# Patient Record
Sex: MALE | Race: WHITE | NOT HISPANIC OR LATINO | Employment: FULL TIME | URBAN - METROPOLITAN AREA
[De-identification: names, ages, dates, MRNs, and addresses within clinical notes are randomized per-mention and may not be internally consistent; named-entity substitution may affect disease eponyms.]

---

## 2017-03-15 DIAGNOSIS — E11.9 TYPE 2 DIABETES MELLITUS WITHOUT COMPLICATIONS (HCC): ICD-10-CM

## 2018-01-09 ENCOUNTER — GENERIC CONVERSION - ENCOUNTER (OUTPATIENT)
Dept: OTHER | Facility: OTHER | Age: 48
End: 2018-01-09

## 2018-01-10 ENCOUNTER — GENERIC CONVERSION - ENCOUNTER (OUTPATIENT)
Dept: OTHER | Facility: OTHER | Age: 48
End: 2018-01-10

## 2018-01-10 ENCOUNTER — HOSPITAL ENCOUNTER (EMERGENCY)
Facility: HOSPITAL | Age: 48
Discharge: HOME | End: 2018-01-10
Admitting: EMERGENCY MEDICINE
Payer: COMMERCIAL

## 2018-01-10 VITALS
HEIGHT: 68 IN | HEART RATE: 76 BPM | TEMPERATURE: 99 F | SYSTOLIC BLOOD PRESSURE: 183 MMHG | OXYGEN SATURATION: 99 % | BODY MASS INDEX: 30.31 KG/M2 | WEIGHT: 200 LBS | RESPIRATION RATE: 18 BRPM | DIASTOLIC BLOOD PRESSURE: 78 MMHG

## 2018-01-10 DIAGNOSIS — H60.91 OTITIS EXTERNA OF RIGHT EAR: Primary | ICD-10-CM

## 2018-01-10 LAB
ANION GAP SERPL CALCULATED.3IONS-SCNC: 4 MMOL/L (ref 4–13)
BASOPHILS # BLD AUTO: 0.01 THOUSANDS/ΜL (ref 0–0.1)
BASOPHILS NFR BLD AUTO: 0 % (ref 0–1)
BUN SERPL-MCNC: 8 MG/DL (ref 5–25)
CALCIUM SERPL-MCNC: 9 MG/DL (ref 8.3–10.1)
CHLORIDE SERPL-SCNC: 100 MMOL/L (ref 100–108)
CO2 SERPL-SCNC: 32 MMOL/L (ref 21–32)
CREAT SERPL-MCNC: 0.81 MG/DL (ref 0.6–1.3)
EOSINOPHIL # BLD AUTO: 0.11 THOUSAND/ΜL (ref 0–0.61)
EOSINOPHIL NFR BLD AUTO: 2 % (ref 0–6)
ERYTHROCYTE [DISTWIDTH] IN BLOOD BY AUTOMATED COUNT: 13 % (ref 11.6–15.1)
GFR SERPL CREATININE-BSD FRML MDRD: 105 ML/MIN/1.73SQ M
GLUCOSE SERPL-MCNC: 142 MG/DL (ref 65–140)
HCT VFR BLD AUTO: 42.9 % (ref 36.5–49.3)
HGB BLD-MCNC: 14.5 G/DL (ref 12–17)
LYMPHOCYTES # BLD AUTO: 1.7 THOUSANDS/ΜL (ref 0.6–4.47)
LYMPHOCYTES NFR BLD AUTO: 27 % (ref 14–44)
MCH RBC QN AUTO: 30.2 PG (ref 26.8–34.3)
MCHC RBC AUTO-ENTMCNC: 33.8 G/DL (ref 31.4–37.4)
MCV RBC AUTO: 89 FL (ref 82–98)
MONOCYTES # BLD AUTO: 0.88 THOUSAND/ΜL (ref 0.17–1.22)
MONOCYTES NFR BLD AUTO: 14 % (ref 4–12)
NEUTROPHILS # BLD AUTO: 3.54 THOUSANDS/ΜL (ref 1.85–7.62)
NEUTS SEG NFR BLD AUTO: 57 % (ref 43–75)
PLATELET # BLD AUTO: 153 THOUSANDS/UL (ref 149–390)
PMV BLD AUTO: 10.3 FL (ref 8.9–12.7)
POTASSIUM SERPL-SCNC: 4.4 MMOL/L (ref 3.5–5.3)
RBC # BLD AUTO: 4.8 MILLION/UL (ref 3.88–5.62)
SODIUM SERPL-SCNC: 136 MMOL/L (ref 136–145)
WBC # BLD AUTO: 6.24 THOUSAND/UL (ref 4.31–10.16)

## 2018-01-10 PROCEDURE — 99283 EMERGENCY DEPT VISIT LOW MDM: CPT

## 2018-01-10 PROCEDURE — 36415 COLL VENOUS BLD VENIPUNCTURE: CPT | Performed by: PHYSICIAN ASSISTANT

## 2018-01-10 PROCEDURE — 85025 COMPLETE CBC W/AUTO DIFF WBC: CPT | Performed by: PHYSICIAN ASSISTANT

## 2018-01-10 PROCEDURE — 80048 BASIC METABOLIC PNL TOTAL CA: CPT | Performed by: PHYSICIAN ASSISTANT

## 2018-01-10 RX ORDER — CIPROFLOXACIN AND DEXAMETHASONE 3; 1 MG/ML; MG/ML
4 SUSPENSION/ DROPS AURICULAR (OTIC) 2 TIMES DAILY
Status: DISCONTINUED | OUTPATIENT
Start: 2018-01-10 | End: 2018-01-10 | Stop reason: HOSPADM

## 2018-01-10 RX ORDER — CIPROFLOXACIN AND DEXAMETHASONE 3; 1 MG/ML; MG/ML
4 SUSPENSION/ DROPS AURICULAR (OTIC) 2 TIMES DAILY
Qty: 7.5 ML | Refills: 0 | Status: SHIPPED | OUTPATIENT
Start: 2018-01-10 | End: 2018-05-18

## 2018-01-10 RX ORDER — IBUPROFEN 600 MG/1
600 TABLET ORAL EVERY 8 HOURS PRN
Qty: 15 TABLET | Refills: 0 | Status: SHIPPED | OUTPATIENT
Start: 2018-01-10 | End: 2018-05-18

## 2018-01-10 RX ORDER — CLINDAMYCIN HYDROCHLORIDE 300 MG/1
CAPSULE ORAL
COMMUNITY
Start: 2018-01-09 | End: 2018-01-10

## 2018-01-10 RX ORDER — CIPROFLOXACIN 500 MG/1
500 TABLET, FILM COATED ORAL EVERY 12 HOURS SCHEDULED
Qty: 14 TABLET | Refills: 0 | Status: SHIPPED | OUTPATIENT
Start: 2018-01-10 | End: 2018-01-17

## 2018-01-10 RX ADMIN — CIPROFLOXACIN AND DEXAMETHASONE 4 DROP: 3; 1 SUSPENSION/ DROPS AURICULAR (OTIC) at 14:34

## 2018-01-10 NOTE — DISCHARGE INSTRUCTIONS
Otitis Externa   WHAT YOU NEED TO KNOW:   Otitis externa, or swimmer's ear, is an infection in the outer ear canal  This canal goes from the outside of the ear to the eardrum  DISCHARGE INSTRUCTIONS:   Return to the emergency department if:   · You have severe ear pain  · You are suddenly unable to hear at all  · You have new swelling in your face, behind your ears, or in your neck  · You suddenly cannot move part of your face  · Your face suddenly feels numb  Contact your healthcare provider if:   · You have a fever  · Your signs and symptoms do not get better after 2 days of treatment  · Your signs and symptoms go away for a time, but then come back  · You have questions or concerns about your condition or care  Medicines:   · NSAIDs , such as ibuprofen, help decrease swelling, pain, and fever  This medicine is available with or without a doctor's order  NSAIDs can cause stomach bleeding or kidney problems in certain people  If you take blood thinner medicine, always ask if NSAIDs are safe for you  Always read the medicine label and follow directions  Do not give these medicines to children under 10months of age without direction from your child's healthcare provider  · Acetaminophen  decreases pain and fever  It is available without a doctor's order  Ask how much to take and how often to take it  Follow directions  Acetaminophen can cause liver damage if not taken correctly  · Ear drops  that contain an antibiotic may be given  The antibiotic helps treat a bacterial infection  You may also be given steroid medicine  The steroid helps decrease redness, swelling, and pain  · Take your medicine as directed  Contact your healthcare provider if you think your medicine is not helping or if you have side effects  Tell him or her if you are allergic to any medicine  Keep a list of the medicines, vitamins, and herbs you take  Include the amounts, and when and why you take them  Bring the list or the pill bottles to follow-up visits  Carry your medicine list with you in case of an emergency  Follow up with your healthcare provider as directed:  Write down your questions so you remember to ask them during your visits  How to use eardrops:   · Lie down on your side with your infected ear facing up  · Carefully drip the correct number of eardrops into your ear  Have another person help you if possible  · Gently move the outside part of your ear back and forth to help the medicine reach your ear canal      · Stay lying down in the same position (with your ear facing up) for 3 to 5 minutes  Prevent otitis externa:   · Do not put cotton swabs or foreign objects in your ears  · Wrap a clean moist washcloth around your finger, and use it to clean your outer ear and remove extra ear wax  · Use ear plugs when you swim  Dry your outer ears completely after you swim or bathe  © 2017 [employer]  Information is for End User's use only and may not be sold, redistributed or otherwise used for commercial purposes  All illustrations and images included in CareNotes® are the copyrighted property of [employer]  or [address]  The above information is an  only  It is not intended as medical advice for individual conditions or treatments  Talk to your doctor, nurse or pharmacist before following any medical regimen to see if it is safe and effective for you

## 2018-01-11 NOTE — RESULT NOTES
Verified Results  (1) RPR [id number] 11:26AM Farshad Bourbon Order Number: [id number]     Test Name Result Flag Reference   RPR Non-Reactive  Non-Reactive

## 2018-01-12 ENCOUNTER — GENERIC CONVERSION - ENCOUNTER (OUTPATIENT)
Dept: OTHER | Facility: OTHER | Age: 48
End: 2018-01-12

## 2018-01-12 ENCOUNTER — ALLSCRIPTS OFFICE VISIT (OUTPATIENT)
Dept: OTHER | Facility: OTHER | Age: 48
End: 2018-01-12

## 2018-01-12 NOTE — RESULT NOTES
Verified Results  (1) HEMOGLOBIN A1C [id number] 08:40AM Sonny Rahman   5 7-6 4% impaired fasting glucose  >=6 5% diagnosis of diabetes    Falsely low levels are seen in conditions linked to short RBC life span-  hemolytic anemia, and splenomegaly  Falsely elevated levels are seen in situations where there is an increased production of RBC- receipt of erythropoietin or blood transfusions  Adopted from ADA-Clinical Practice Recommendations     Test Name Result Flag Reference   HEMOGLOBIN A1C 6 2 % H 4 0-5 6   EST  AVG   GLUCOSE 131 mg/dl

## 2018-01-14 NOTE — RESULT NOTES
Verified Results  (1) COMPREHENSIVE METABOLIC PANEL [id number] 50:34BL Bjorn Baumgarten Order Number: [id number]     Test Name Result Flag Reference   GLUCOSE,RANDM 129 mg/dL     If the patient is fasting, the ADA then defines impaired fasting glucose as > 100 mg/dL and diabetes as > or equal to 123 mg/dL  SODIUM 135 mmol/L L 136-145   POTASSIUM 4 6 mmol/L  3 5-5 3   CHLORIDE 99 mmol/L L 100-108   CARBON DIOXIDE 28 mmol/L  21-32   ANION GAP (CALC) 8 mmol/L  4-13   BLOOD UREA NITROGEN 13 mg/dL  5-25   CREATININE 0 90 mg/dL  0 60-1 30   Standardized to IDMS reference method   CALCIUM 9 4 mg/dL  8 3-10 1   BILI, TOTAL 0 89 mg/dL  0 20-1 00   ALK PHOSPHATAS 100 U/L     ALT (SGPT) 23 U/L  12-78   AST(SGOT) 6 U/L  5-45   ALBUMIN 4 1 g/dL  3 5-5 0   TOTAL PROTEIN 7 6 g/dL  6 4-8 2   eGFR Non-African American      >60 0 ml/min/1 73[address] [facility] Disease Education Program recommendations are as follows:  GFR calculation is accurate only with a steady state creatinine  Chronic Kidney disease less than 60 ml/min/1 73 sq  meters  Kidney failure less than 15 ml/min/1 73 sq  meters  (1) HEMOGLOBIN A1C [id number] 09:24AM Bjorn Baumgarten Order Number: [id number]     Test Name Result Flag Reference   HEMOGLOBIN A1C 6 4 % H 4 2-6 3   EST  AVG   GLUCOSE 137 mg/dl       (1) LDL,DIRECT 12Sep2016 09:24AM Bjorn Baumgarten Order Number: [id number]     Test Name Result Flag Reference   LDL, DIRECT 153 mg/dl H 0-100   LDL Cholesterol:        Optimal          <100 mg/dl        Near Optimal     100-129 mg/dl        Above Optimal          Borderline High   130-159 mg/dl          High              160-189 mg/dl          Very High        >189 mg/dl     (1) MICROALBUMIN CREATININE RATIO, RANDOM URINE [id number] 09:24AM Bjorn Baumgarten Order Number: [id number]     Test Name Result Flag Reference   MICROALBUMIN/ CREAT R <13 mg/g creatinine  0-30   MICROALBUMIN,URINE <5 0 mg/L  0 0-20 0   CREATININE URINE 40 0 mg/dL

## 2018-01-15 NOTE — PROGRESS NOTES
Assessment    1  Encounter for preventive health examination (V70 0) (Z00 00)   2  Dyslipidemia (272 4) (E78 5)   3  Diabetes mellitus (250 00) (E11 9)    Plan  Diabetes mellitus    · (1) HEMOGLOBIN A1C; Status:Active; Requested for:03Mar2016;    · (1) MICROALBUMIN CREATININE RATIO, RANDOM URINE; Status:Active; Requested  for:03Mar2016;   Dyslipidemia    · (1) COMPREHENSIVE METABOLIC PANEL; Status:Active; Requested for:03Mar2016;    · (1) LDL,DIRECT; Status:Active; Requested for:03Mar2016;   Hypertension    · Valsartan 320 MG Oral Tablet; Take 1 tablet daily    Discussion/Summary  Impression: health maintenance visit  Currently, he eats a healthy diet and eats an adequate diet  Prostate cancer screening: the risks and benefits of prostate cancer screening were discussed and prostate cancer screening is current  Testicular cancer screening: the risks and benefits of testicular cancer screening were discussed, self testicular exam technique was taught and monthly self testicular exam was advised  Advice and education were given regarding nutrition, aerobic exercise, weight bearing exercise, weight loss, calcium supplements, vitamin D supplements, reproductive health, cardiovascular risk reduction, alcohol use, sunscreen use, self skin examination, helmet use, seat belt use, fall risk reduction and advanced directive planning  History of Present Illness  HM, Adult Male: The patient is being seen for a health maintenance evaluation  General Health: The patient's health since the last visit is described as good  He has regular dental visits  He denies vision problems  He denies hearing loss  Immunizations status: up to date  Lifestyle:  He consumes a diverse and healthy diet  He does not have any weight concerns  He exercises regularly  He does not use tobacco  He denies alcohol use  He denies drug use  Reproductive health:  the patient is sexually active  birth control is not being practiced   He denies erectile dysfunction  Screening: cancer screening reviewed and current  metabolic screening reviewed and current  risk screening reviewed and current  Review of Systems    Constitutional: No fever or chills, feels well, no tiredness, no recent weight gain or weight loss  Eyes: No complaints of eye pain, no red eyes, no discharge from eyes, no itchy eyes  ENT: no complaints of earache, no hearing loss, no nosebleeds, no nasal discharge, no sore throat, no hoarseness  Cardiovascular: No complaints of slow heart rate, no fast heart rate, no chest pain, no palpitations, no leg claudication, no lower extremity  Respiratory: No complaints of shortness of breath, no wheezing, no cough, no SOB on exertion, no orthopnea or PND  Gastrointestinal: No complaints of abdominal pain, no constipation, no nausea or vomiting, no diarrhea or bloody stools  Genitourinary: No complaints of dysuria, no incontinence, no hesitancy, no nocturia, no genital lesion, no testicular pain  Musculoskeletal: No complaints of arthralgia, no myalgias, no joint swelling or stiffness, no limb pain or swelling  Integumentary: No complaints of skin rash or skin lesions, no itching, no skin wound, no dry skin  Neurological: No compliants of headache, no confusion, no convulsions, no numbness or tingling, no dizziness or fainting, no limb weakness, no difficulty walking  Psychiatric: Is not suicidal, no sleep disturbances, no anxiety or depression, no change in personality, no emotional problems  Endocrine: No complaints of proptosis, no hot flashes, no muscle weakness, no erectile dysfunction, no deepening of the voice, no feelings of weakness  Hematologic/Lymphatic: No complaints of swollen glands, no swollen glands in the neck, does not bleed easily, no easy bruising  Active Problems    1  Back pain (724 5) (M54 9)   2  Back strain (847 9) (S39 012A)   3  Bacterial pneumonia (482 9) (J15 9)   4   Diabetes mellitus (250 00) (E11 9)   5  Dizziness (780 4) (R42)   6  Dyslipidemia (272 4) (E78 5)   7  Glaucoma screening (V80 1) (Z13 5)   8  Hyperglycemia (790 29) (R73 9)   9  Hypertension (401 9) (I10)    Past Medical History    · History of fever (V13 89) (Z87 898)   · History of pneumonia (V12 61) (Z87 01)    Surgical History    · History of Oral Surgery Tooth Extraction    Family History    · Family history of Diabetes Mellitus (250 00)   · Family history of cardiac disorder (V17 49) (Z82 49)   · Family history of hypertension (V17 49) (Z82 49)    Social History    · Alcohol Use (History)   · Former smoker (V15 82) (F14 992)    Current Meds   1  Invokamet  MG Oral Tablet; Take 1 tablet twice daily; Therapy: 72LML0867 to (Evaluate:10Rhx4576)  Requested for: 44MSW1364; Last   Rx:[id number] Ordered   2  Valsartan 160 MG Oral Tablet; take 1 tablet by mouth daily; Therapy: 84ECG7383 to (Evaluate:52Ykg4981)  Requested for: 88RXJ7952; Last   Rx:[id number] Ordered    Allergies    1  Sulfa Drugs    Vitals   Recorded: 29VVJ3074 02:24PM   Heart Rate 788   Systolic 383, LUE, Sitting   Diastolic 90, LUE, Sitting   Height 5 ft 8 in   Weight 220 lb 4 00 oz   BMI Calculated 33 49   BSA Calculated 2 14     Physical Exam    Constitutional   General appearance: No acute distress, well appearing and well nourished  Eyes   Conjunctiva and lids: No swelling, erythema, or discharge  Pupils and irises: Equal, round and reactive to light  Ears, Nose, Mouth, and Throat   External inspection of ears and nose: Normal     Otoscopic examination: Tympanic membrance translucent with normal light reflex  Canals patent without erythema  Oropharynx: Normal with no erythema, edema, exudate or lesions  Pulmonary   Respiratory effort: No increased work of breathing or signs of respiratory distress  Auscultation of lungs: Clear to auscultation      Cardiovascular   Auscultation of heart: Normal rate and rhythm, normal S1 and S2, without murmurs  Examination of extremities for edema and/or varicosities: Normal     Abdomen   Abdomen: Non-tender, no masses  Lymphatic   Palpation of lymph nodes in neck: No lymphadenopathy  Musculoskeletal   Gait and station: Normal     Digits and nails: Normal without clubbing or cyanosis  Inspection/palpation of joints, bones, and muscles: Normal     Skin   Skin and subcutaneous tissue: Normal without rashes or lesions  Neurologic   Cranial nerves: Cranial nerves 2-12 intact  Reflexes: 2+ and symmetric  Sensation: No sensory loss  Psychiatric   Orientation to person, place and time: Normal     Mood and affect: Normal        Procedure    Procedure: Hearing Acuity Test    Audiometry:   The patient Tolerated the procedure well  There were no complications  Procedure: Visual Acuity Test    Results: normal in both eyes  The patient tolerated the procedure well  There were no complications        Signatures   Electronically signed by : JORDY Dunlap; Mar  3 2016  3:05PM EST                       (Author)    Electronically signed by : Luanne Urena DO; Mar  3 2016  5:55PM EST                       (Author)

## 2018-01-16 NOTE — ED PROVIDER NOTES
History  Chief Complaint   Patient presents with    Ear Problem     stated he was sent in from Dr Valentin Ward office for a severe infection of right ear  was sen by fmd on 1/9/18 and started on clindamycin  Thelda Julianna of right ear got much worse today     49year old male presents emergency department at the request of his PCP today for treatment for a right-sided ear infection  Patient reports onset of symptoms of pain redness and fullness to the right outer ear starting two days ago, was seen by PCP yesterday diagnosis of cellulitis of the earlobe was prescribed mupirocin and clindamycin p  o   Patient has taken four doses thus far  Had a follow-up with his PCP today and was told to come the emergency department as the redness was worse, the canal was more swollen, they were unable to place an ear wick or any ear drops due to the medication not being authorized the pharmacy, was sent here for IV antibiotics per patient  Patient reports no fevers or chills no facial pain, no nasal pain or nose lesions or crusting  He does have decreased hearing to the right side as everything sounds muffled  The right inner ear is not painful, he only has pain with touching the external ear  He has not had any drainage or bleeding from the ear  He reports he thinks the redness was worse yesterday than today as yesterday came down the lateral side of the face in the prevertebral region and the top of the right mandible  He has no tooth pain, no cheek pain, no pain with opening and closing the mouth  Patient does have type 2 diabetes, which is diet controlled, no meds, does not check sugars  Patient has not previously had issues with his ears nose or throat  He denies any water in the ear or blunt trauma to the ear side of the head and no preceding URI symptoms  He does not have rash elsewhere on his body including the face, eyes, nose, trunk  No previous history of herpes zoster          History provided by:  Patient and medical records      Prior to Admission Medications   Prescriptions Last Dose Informant Patient Reported? Taking? clindamycin (CLEOCIN) 300 MG capsule   Yes Yes   Sig: Take by mouth   mupirocin (BACTROBAN) 2 % ointment   Yes Yes   Sig: Apply topically 3 (three) times a day      Facility-Administered Medications: None       Past Medical History:   Diagnosis Date    Diabetes mellitus ([facility] [address] )     Hypertension        History reviewed  No pertinent surgical history  History reviewed  No pertinent family history  I have reviewed and agree with the history as documented  Social History   Substance Use Topics    Smoking status: Light Tobacco Smoker     Packs/day: 0 20     Types: Cigarettes    Smokeless tobacco: Never Used    Alcohol use Yes      Comment: 1/2 of a fifth a day        Review of Systems   Constitutional: Negative for appetite change, chills, diaphoresis, fatigue, fever and unexpected weight change  HENT: Positive for ear pain  Negative for congestion, ear discharge, rhinorrhea, sinus pressure, sore throat and tinnitus  Eyes: Negative for visual disturbance  Respiratory: Negative for cough, chest tightness, shortness of breath and wheezing  Cardiovascular: Negative for chest pain, palpitations and leg swelling  Gastrointestinal: Negative for abdominal pain, constipation, diarrhea, nausea and vomiting  Genitourinary: Negative for dysuria, flank pain, frequency, hematuria and urgency  Musculoskeletal: Negative for arthralgias, back pain and myalgias  Skin: Negative for rash and wound  Neurological: Negative for dizziness, tremors, syncope and headaches         Physical Exam  ED Triage Vitals [01/10/18 1309]   Temperature Pulse Respirations Blood Pressure SpO2   99 °F (37 2 °C) 88 18 (!) 207/127 99 %      Temp Source Heart Rate Source Patient Position - Orthostatic VS BP Location FiO2 (%)   Tympanic Monitor Sitting Left arm --      Pain Score       6           Orthostatic Vital Signs  Vitals:    01/10/18 1309 01/10/18 1525   BP: (!) 207/127 (!) 183/78   Pulse: 88 76   Patient Position - Orthostatic VS: Sitting        Physical Exam   Constitutional: He is oriented to person, place, and time  He appears well-developed and well-nourished  No distress  HENT:   Head: Normocephalic and atraumatic  Head is without right periorbital erythema and without left periorbital erythema  Right Ear: External ear normal  There is swelling and tenderness  No drainage  No mastoid tenderness  Tympanic membrane is not perforated, not erythematous and not bulging  Decreased hearing is noted  Left Ear: Hearing normal  No drainage, swelling or tenderness  No mastoid tenderness  Tympanic membrane is not perforated, not erythematous and not bulging  No decreased hearing is noted  Ears:    Nose: Nose normal    Mouth/Throat: Oropharynx is clear and moist    Eyes: Conjunctivae and EOM are normal  Pupils are equal, round, and reactive to light  Right eye exhibits no discharge  Left eye exhibits no discharge  Neck: Normal range of motion  Neck supple  Cardiovascular: Normal rate, regular rhythm, normal heart sounds and intact distal pulses  No murmur heard  Pulmonary/Chest: Effort normal and breath sounds normal  No respiratory distress  He exhibits no tenderness  Musculoskeletal: He exhibits no edema  Lymphadenopathy:     He has no cervical adenopathy  Neurological: He is alert and oriented to person, place, and time  No cranial nerve deficit or sensory deficit  Skin: Skin is warm and dry  He is not diaphoretic  Psychiatric: He has a normal mood and affect  Nursing note and vitals reviewed        ED Medications  Medications - No data to display    Diagnostic Studies  Results Reviewed     Procedure Component Value Units Date/Time    Basic metabolic panel [[id number]]  (Abnormal) Collected:  01/10/18 1414    Lab Status:  Final result Specimen:  Blood from Arm, Left Updated:  01/10/18 1432 Sodium 136 mmol/L      Potassium 4 4 mmol/L      Chloride 100 mmol/L      CO2 32 mmol/L      Anion Gap 4 mmol/L      BUN 8 mg/dL      Creatinine 0 81 mg/dL      Glucose 142 (H) mg/dL      Calcium 9 0 mg/dL      eGFR 105 ml/min/1 73sq m     Narrative:         National Kidney Disease Education Program recommendations are as follows:  GFR calculation is accurate only with a steady state creatinine  Chronic Kidney disease less than 60 ml/min/1 73 sq  meters  Kidney failure less than 15 ml/min/1 73 sq  meters  CBC and differential [[id number]]  (Abnormal) Collected:  01/10/18 1414    Lab Status:  Final result Specimen:  Blood from Arm, Left Updated:  01/10/18 1425     WBC 6 24 Thousand/uL      RBC 4 80 Million/uL      Hemoglobin 14 5 g/dL      Hematocrit 42 9 %      MCV 89 fL      MCH 30 2 pg      MCHC 33 8 g/dL      RDW 13 0 %      MPV 10 3 fL      Platelets 742 Thousands/uL      Neutrophils Relative 57 %      Lymphocytes Relative 27 %      Monocytes Relative 14 (H) %      Eosinophils Relative 2 %      Basophils Relative 0 %      Neutrophils Absolute 3 54 Thousands/µL      Lymphocytes Absolute 1 70 Thousands/µL      Monocytes Absolute 0 88 Thousand/µL      Eosinophils Absolute 0 11 Thousand/µL      Basophils Absolute 0 01 Thousands/µL                  No orders to display              Procedures  Procedures       Phone Contacts  ED Phone Contact    ED Course  ED Course as of Braxton 15 2128   Wed Braxton 10, 2018   1428 WBC: 6 24   1428 Hemoglobin: 14 5   1428 Hematocrit: 42 9   1428 Platelets: 867   7000 Sodium: 136   1440 Potassium: 4 4   1440 Chloride: 100   1440 CO2: 32   1440 Anion Gap: 4   1440 BUN: 8   1440 Creatinine: 0 81   1440 Glucose: (!) 142   1440 Calcium: 9 0   1440 eGFR: 105       MDM  Number of Diagnoses or Management Options  Otitis externa of right ear: new and requires workup  Diagnosis management comments: 69-year-old male with day two of right otitis externa, will discontinue clindamycin and start p o  Cipro , ear wick was placed in the right ear canal, saturated with Ciprodex drops x4, with appropriate expansion  Patient tolerated well  He has no pain, no fevers no lab abnormalities, no tenderness surrounding the temporal or mastoid bones, no otorrhea or surrounding rash to suggest fungal or viral etiology  Patient is made an appointment to follow up with ENT this Friday which would be 48 hours  Amount and/or Complexity of Data Reviewed  Clinical lab tests: ordered and reviewed  Review and summarize past medical records: yes    Patient Progress  Patient progress: stable    CritCare Time    Disposition  Final diagnoses:   Otitis externa of right ear     Time reflects when diagnosis was documented in both MDM as applicable and the Disposition within this note     Time User Action Codes Description Comment    1/10/2018  3:06 PM Mayte Gross Add [R50 91] Otitis externa of right ear       ED Disposition     ED Disposition Condition Comment    Discharge  Fatimah Richardson  discharge to home/self care  Condition at discharge: Good        Follow-up Information     Follow up With Specialties Details Why Rosa Anderson MD Otolaryngology, Plastic Surgery  Ear/Nose/Throat followup  office will call you for appointment this friday 1/12/18   if you do not hear from them by tomorrow please call the number above [address] [place] [phone]  [phone]          Discharge Medication List as of 1/10/2018  3:22 PM      START taking these medications    Details   ciprofloxacin (CIPRO) 500 mg tablet Take 1 tablet by mouth every 12 (twelve) hours for 7 days, Starting Wed 1/10/2018, Until Wed 1/17/2018, Print      ciprofloxacin-dexamethasone (CIPRODEX) otic suspension Administer 4 drops to the right ear 2 (two) times a day, Starting Wed 1/10/2018, Print      ibuprofen (MOTRIN) 600 mg tablet Take 1 tablet by mouth every 8 (eight) hours as needed for mild pain or moderate pain for up to 5 days, Starting Wed 1/10/2018, Until Mon 1/15/2018, Print         CONTINUE these medications which have NOT CHANGED    Details   mupirocin (BACTROBAN) 2 % ointment Apply topically 3 (three) times a day, Historical Med         STOP taking these medications       clindamycin (CLEOCIN) 300 MG capsule Comments:   Reason for Stopping:             No discharge procedures on file      ED Provider  Electronically Signed by           Amado Stafford PA-C  01/15/18 1338

## 2018-01-16 NOTE — RESULT NOTES
Verified Results  (1) CHLAMYDIA/GC AMPLIFIED DNA, PCR [id number] 11:26AM Peyton Neither   TW Order Number: [id number]     Test Name Result Flag Reference   CHLAMYDIA,AMPLIFIED DNA PROBE   C  trachomatis Amplified DNA Negative   C  trachomatis Amplified DNA Negative   For optimal microbe detection, urine samples should be a first catch specimen (20-60 ml of urine)  Patient should not have urinated for at least 1 hour prior to collection  A specimen not collected in this manner may have falsely negative results  Bunny Marshall AMPLIFIED DNA   N  gonorrhoeae Amplified DNA Negative   N  gonorrhoeae Amplified DNA Negative     (1) HIV AG/AB Joi Pepper GEN [id number] 11:26AM Peyton Neither   TW Order Number: [id number]     Test Name Result Flag Reference   HIV 1/2 AND P24 Non-Reactive  Non-Reactive   This test detects HIV 1, HIV2 and p24 Antigen  (1) ESTROGEN [id number] 11:26AM Sandra Carney Order Number: [id number]     Test Name Result Flag Reference   ESTROGEN 143 pg/mL H 40 - 115   Performed at:  [address]  [id number]  : Shane Espinoza MD, Phone:  [phone]     (1) TESTOSTERONE, FREE (DIRECT) AND TOTAL [id number] 11:26AM Sandra Nirmal Order Number: [id number]     Test Name Result Flag Reference   FREE TESTOSTERONE, DIRECT 12 2 pg/mL  6 8 - 21 5   COMMENT Comment     Adult male reference interval is based on a population of lean malesup to 36years old     TESTOSTERONE (TOTAL) 426 ng/dL  348 - 1197   Performed at:  [facility] [place] [address]  [id number]  : Nadege Heredia MD, Phone:  [phone]

## 2018-01-19 ENCOUNTER — GENERIC CONVERSION - ENCOUNTER (OUTPATIENT)
Dept: OTHER | Facility: OTHER | Age: 48
End: 2018-01-19

## 2018-01-19 ENCOUNTER — ALLSCRIPTS OFFICE VISIT (OUTPATIENT)
Dept: OTHER | Facility: OTHER | Age: 48
End: 2018-01-19

## 2018-01-24 VITALS
TEMPERATURE: 98.3 F | BODY MASS INDEX: 30.01 KG/M2 | SYSTOLIC BLOOD PRESSURE: 164 MMHG | DIASTOLIC BLOOD PRESSURE: 98 MMHG | WEIGHT: 198 LBS | HEIGHT: 68 IN

## 2018-01-24 VITALS
WEIGHT: 198.19 LBS | SYSTOLIC BLOOD PRESSURE: 174 MMHG | OXYGEN SATURATION: 96 % | BODY MASS INDEX: 30.04 KG/M2 | HEART RATE: 94 BPM | HEIGHT: 68 IN | DIASTOLIC BLOOD PRESSURE: 92 MMHG

## 2018-01-24 VITALS
SYSTOLIC BLOOD PRESSURE: 158 MMHG | WEIGHT: 200.5 LBS | TEMPERATURE: 98.6 F | HEIGHT: 68 IN | DIASTOLIC BLOOD PRESSURE: 88 MMHG | BODY MASS INDEX: 30.39 KG/M2

## 2018-01-24 VITALS
BODY MASS INDEX: 30.04 KG/M2 | OXYGEN SATURATION: 94 % | DIASTOLIC BLOOD PRESSURE: 96 MMHG | SYSTOLIC BLOOD PRESSURE: 100 MMHG | WEIGHT: 198.19 LBS | HEIGHT: 68 IN | HEART RATE: 93 BPM

## 2018-01-26 ENCOUNTER — OFFICE VISIT (OUTPATIENT)
Dept: OTOLARYNGOLOGY | Facility: CLINIC | Age: 48
End: 2018-01-26
Payer: COMMERCIAL

## 2018-01-26 VITALS
SYSTOLIC BLOOD PRESSURE: 172 MMHG | WEIGHT: 196 LBS | DIASTOLIC BLOOD PRESSURE: 100 MMHG | HEIGHT: 68 IN | HEART RATE: 98 BPM | BODY MASS INDEX: 29.7 KG/M2

## 2018-01-26 DIAGNOSIS — H61.22 IMPACTED CERUMEN OF LEFT EAR: Primary | ICD-10-CM

## 2018-01-26 DIAGNOSIS — Z72.0 TOBACCO ABUSE: ICD-10-CM

## 2018-01-26 DIAGNOSIS — H60.501 ACUTE OTITIS EXTERNA OF RIGHT EAR, UNSPECIFIED TYPE: ICD-10-CM

## 2018-01-26 PROBLEM — H60.91 RIGHT OTITIS EXTERNA: Status: ACTIVE | Noted: 2018-01-26

## 2018-01-26 PROCEDURE — 99214 OFFICE O/P EST MOD 30 MIN: CPT | Performed by: NURSE PRACTITIONER

## 2018-01-26 NOTE — PROGRESS NOTES
Gt Espinoza  is a 49 y o male who presents for re-evaluation of left cerumen impaction and right otitis externa  Reports right ear is feeling substantially better  Right wick placed x 2 weeks ago and removed last week s/p Ciprodex drops  Reports he started using Debrox drops last night for today's cerumen removal  Denies left ear blocked sensation  Denies otalgia, otorrhea, hearing loss, loud noise exposure without ear protection, fever, night sweats, unintentional weight loss and any neck lesions or masses  Current smoker 1-2ppd x 20 years  No further ENT complaints  Past Medical History:   Diagnosis Date    Diabetes mellitus ([facility] [address] )     Hypertension          Physical Exam   Constitutional: Oriented to person, place, and time  Well-developed and well-nourished, no apparent distress, non-toxic appearance  Cooperative, able to hear and answer questions without difficulty  Voice: Normal voice quality  Head: Normocephalic, atraumatic  No scars, masses or lesions  Face: Symmetric, no edema, no sinus tenderness  Eyes: Vision grossly intact, extra-ocular movement intact  Right Ear: External ear normal   Auditory canal clear  No post-auricular erythema or tenderness Right TM is intact with tympanosclerosis  Left Ear: External ear normal   Auditory canal clear  No post-auricular erythema or tenderness  Left cerumen impaction  Nose: Septum midline, nares clear  Mucosa moist, turbinates well appearing  No crusting, polyps or discharge evident  Oral cavity: Dentition intact  Mucosa moist, lips normal   Tongue mobile, floor of mouth normal   Hard palate unremarkable  No masses or lesions  Oropharynx: Uvula is midline, soft palate normal   Unremarkable oropharyngeal inlet  Tonsils unremarkable  Posterior pharyngeal wall clear  No masses or lesions  Salivary glands:  Parotid glands and submandibular glands symmetric, no enlargement or tenderness  Neck: Normal laryngeal elevation with swallow  Trachea midline  No masses or lesions  No palpable adenopathy  Thyroid: normal in size, unremarkable without tenderness or palpable nodules  Pulmonary/Chest: Normal effort and rate  No respiratory distress  Musculoskeletal: Normal range of motion  Neurological: Cranial nerves 2-12 intact  Skin: Skin is warm and dry  Psychiatric: Normal mood and affect  Procedure: Cerumen debridement left EAC    Indications: Cerumen impaction     Procedure in detail: After informed verbal consent was obtained the ear was visualized using microscopy  Using cerumen loop, suction, and alligator forceps the cerumen was unable to be debrided and the findings below were seen  The patient tolerated the procedure well  FINDINGS: Left EAC cerumen impaction  A/P:   Cerumen impaction: We discussed ongoing management of cerumen  Recommended using Debrox drops to left ear 5-7 days prior to return visit to debride cerumen  Cerumen is not bothersome to patient  Right OE: We discussed ongoing management of right OE which has resolved  Tobacco abuse: Discussed risks of ongoing cigarette smoking  Discussed the benefits of quitting immediately and prior to any surgery  Discussed options including support, quit date, medications, and family support  Patient voiced understanding and knowledge  Greater than 3 minutes were needed for discussion of tobacco dependence  HTN: Discussed ongoing management of HTN  States secondary to lack of insurance coverage he self-discontinued his anti-HTN meds approximately 1 year ago  States he will follow up with Dr Yoan Nguyen in the near future regarding his blood pressure management  Follow up 2-4 weeks

## 2018-02-26 NOTE — CONSULTS
Chief Complaint  Chief Complaint Free Text Note Form: EAR INFECTION, SEEN IN ER ON 1/10/18/REF BY DR Alicia Xiong THE ER      History of Present Illness  HPI: 79-year-old diabetic male presents for evaluation of right-sided otitis externa  He was seen by Negin Garcia for a right-sided otitis externa which she needed to have a wick placed for  He was started on Ciprodex otic  He notes substantial improvement in swelling  He was also seen in the ED and repeat wick placement was done  He continues to have improvement with decreased pain and swelling  He is uncertain what his blood sugar is been for the last few months  He is unable to get his diabetes medication due to his financial situation  He notes that the ear infection started one week ago  No previous ear drainage  No previous ear trauma  No previous ear surgery  When he was cleaning his ear yesterday he feels like he may have pulled portion of the ear wick out but not completely  Review of Systems  Complete ENT ROS [facility]:   Eyes: No complaints of itching, excessive tearing or vision changes  Ears: recent ear infections  Nose: No nasal obstruction, no discharge or runniness, no bleeding, no dryness, no sneezing and no loss of smell  Mouth: No sores in mouth, no altered taste, no dental problems  Throat: No complaints of throat pain, no difficulty swallowing, no hoarseness  Neck: No neck soreness, no neck pain, no neck lumps or swelling  Genitourinary: No complaints of dysuria, flank pain or frequent urination  Cardiovascular: No complaints of chest pain or palpitations  Respiratory: No complaints of shortness of breath, cough or wheezing  Gastrointestinal: No complaints of heartburn, nausea/vomiting, or constipation  Neurological: No complaints of headache, convulsions or memory loss  ROS Reviewed:   ROS reviewed  Active Problems    1  Cellulitis of right ear (380 10) (H60 11)   2  Diabetes mellitus (250 00) (E11 9)   3  Dyslipidemia (272 4) (E78 5)   4  Hypertension (401 9) (I10)    Past Medical History    1  History of back pain (V13 59) (Z87 39)   2  History of bacterial pneumonia (V12 61) (Z87 01)   3  History of dizziness (V13 89) (Z87 898)   4  History of fever (V13 89) (Z87 898)   5  History of hyperglycemia (V12 29) (Z86 39)   6  History of pneumonia (V12 61) (Z87 01)   7  History of strain of back (V13 59) (Z87 39)   8  History of Screening for STD (sexually transmitted disease) (V74 5) (Z11 3)   9  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)  Past Medical History Reviewed: The past medical history was reviewed and updated today  Surgical History    1  History of Oral Surgery Tooth Extraction  Surgical History Reviewed: The surgical history was reviewed and updated today  Family History    1  Family history of cardiac disorder (V17 49) (Z82 49)   2  Family history of diabetes mellitus (V18 0) (Z83 3)   3  Family history of hypertension (V17 49) (Z82 49)  Family History Reviewed: The family history was reviewed and updated today  Social History    · Alcohol Use (History)   · Current some day smoker (305 1) (F17 200)  Social History Reviewed: The social history was reviewed and updated today  Current Meds   1  Ciprofloxacin HCl - 0 2 % Otic Solution; Instill 2-3 drops in affected ear BID x 7 days; Therapy: 55WWC7744 to (Last Rx:09Jan2018)  Requested for: 66MJN4155 Ordered   2  Clindamycin HCl - 300 MG Oral Capsule; take 2 capsule 3 times daily; Therapy: 42ZDC6499 to (Complete:19Jan2018); Last Rx:09Jan2018 Ordered   3  Mupirocin 2 % External Ointment; apply to entire external R ear TID; Therapy: 91AWV4616 to (Last Rx:09Jan2018) Ordered    Allergies    1   Sulfa Drugs    Vitals  Signs   Recorded: 12Jan2018 10:55AM   Heart Rate: 94  Systolic: 029, LUE, Sitting  Diastolic: 92, LUE, Sitting  Height: 5 ft 8 in  Weight: 198 lb 3 oz  BMI Calculated: 30 13  BSA Calculated: 2 04  O2 Saturation: 96    Physical Exam    Constitutional:   General appearance: Well developed, well nourished  Ability to communicate: Voice normal  Speech normal    Head and Face:   Head and face: Head normocephalic, atraumatic with no lesions or palpable masses  Submandibular glands and parotid glands: non tender, no masses  Eyes:   Test of Ocular Motility: Gaze normal  No nystagmus  Ears:   Otoscopic examination: Abnormal  Left ear has mild cerumen  Not completely obstructive  Right pinna is erythematous and inflamed  There is swelling of the tragus and conchal bowl  Small piece of the wick was removed as the remainder have been removed by the patient  Unable to visualize the tympanic membrane due to external auditory canal swelling  Hearing: Normal    Nose:   External auditory canals: No cerumen impaction noted, no drainage observed, no edema noted in EAC, no exostoses present, no osteoma present, no tenderness noted  External Inspection of Nose: No deformities observed, no deviation of bone structure, no skin lesion present, no swelling present  Nares are symmetric, no deviation of caudal portion of septum  Nasal Mucosa: No congestion observed, no mucosal lesion or masses present, no ulcerations observed  Cartilaginous Septum: midline, no bleeding noted, no crusting present, no perforation noted  Turbinates: No hypertrophy or inflammation noted  Mouth: Inspection of Lips, Teeth, Gums: Lips normal in color, moist, no cracks or lesions  No loose teeth, no missing teeth  Gingiva: no bleeding observed, no inflammation present  Hard Palate: no asymmetry observed, no torus present  Soft palate normal with no ulcers noted  Throat:   Examination of Oropharynx: Oral Mucosa: no masses, lesions, leukoplakia, or scarring  Normal Alexandria's ducts, pink and moist, no discoloration noted  Floor of mouth: normal Warthin's ducts, no lesions, ulcerations, leukoplakia or torus mandibularis   Tonsils: no hypertrophy or ulcerations noted  Tongue: normal mobility, surfaces without fissures, leukoplakia, ulceration or masses, not enlarged, no pallor noted, no white patches present  Neck:   Examination of Neck: No decreased range of motion, trachea midline  Examination of Thyroid: Normal size, non-tender, no palpable masses  Lymphatic:   Palpation of Lymph Nodes: Neck: No generalized lymphadenopathy  Neurological/Psychiatric:   Cranial nerves II-VII grossly intact  Oriented to person, place, and time  Cooperative, in no acute distress  Procedure  Procedure: Right ear foreign body removal    Indications: Right ear foreign body  Procedure in detail: After informed verbal consent was obtained the ear was visualized using microscopy  Using cerumen loop, suction, and alligator forceps the debris and the remaining portion of the ear wick was debrided and the findings below were seen  The patient tolerated the procedure well  FINDINGS:  Severely edematous ear canal  Second wick was placed  Assessment    1  History of Oral Surgery Tooth Extraction   2  Family history of diabetes mellitus (V18 0) (Z83 3) : Maternal Uncle   3  Current some day smoker (305 1) (F17 200)   4  Acute diffuse otitis externa of right ear (380 10) (H60 311)   5  Diabetes mellitus (250 00) (E11 9)    Discussion/Summary  Discussion Summary:   Diffuse otitis externa: New wick was placed he will start drops with Ciprodex twice daily  He will return in one week for reevaluation  If he does not have substantial improvement wick will be removed and a new one replaced  If his ear canal is clear and the TM can be seen he will just continue on Ciprodex drops and we will see him again in 2 weeks  Diabetes: We discussed that he needs significantly improved control of his diabetes  If otitis externa is allowed to proceed in patients with poorly controlled diabetes this can result in malignant otitis externa  Cigarette smoking:  We discussed extensively the ongoing cigarette smoking and discussed quit date  We discussed options for quitting including medications and support structures  Greater than 3 minutes was taken to discuss this         Signatures   Electronically signed by : SETH Bell ; Jan 12 2018 11:35AM EST                       (Author)

## 2018-02-26 NOTE — MISCELLANEOUS
Message  Return to work or school:   Austin Kidney is under my professional care  He was seen in my office on 01/09/2018       Patient was here for appt on 1/9/18 and no work until seen on 1/10/18 which he was then sent to Emergency Room  [facility]        Signatures   Electronically signed by : Tati Dorsey, [facility]; Jan 12 2018 12:58PM EST                       (Author)
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
## 2018-04-14 ENCOUNTER — HOSPITAL ENCOUNTER (EMERGENCY)
Facility: HOSPITAL | Age: 48
Discharge: HOME | End: 2018-04-14
Attending: EMERGENCY MEDICINE
Payer: COMMERCIAL

## 2018-04-14 VITALS
OXYGEN SATURATION: 96 % | RESPIRATION RATE: 18 BRPM | WEIGHT: 190 LBS | TEMPERATURE: 97.9 F | DIASTOLIC BLOOD PRESSURE: 99 MMHG | SYSTOLIC BLOOD PRESSURE: 173 MMHG | HEART RATE: 87 BPM | BODY MASS INDEX: 28.89 KG/M2

## 2018-04-14 DIAGNOSIS — R23.3 PETECHIAL ERUPTION: ICD-10-CM

## 2018-04-14 DIAGNOSIS — R21 RASH AND NONSPECIFIC SKIN ERUPTION: Primary | ICD-10-CM

## 2018-04-14 LAB
ALBUMIN SERPL BCP-MCNC: 3.9 G/DL (ref 3.5–5)
ALP SERPL-CCNC: 86 U/L (ref 46–116)
ALT SERPL W P-5'-P-CCNC: 113 U/L (ref 12–78)
ANION GAP SERPL CALCULATED.3IONS-SCNC: 7 MMOL/L (ref 4–13)
APTT PPP: 27 SECONDS (ref 23–35)
AST SERPL W P-5'-P-CCNC: 55 U/L (ref 5–45)
BASOPHILS # BLD AUTO: 0 THOUSANDS/ΜL (ref 0–0.1)
BASOPHILS NFR BLD AUTO: 0 % (ref 0–1)
BILIRUB SERPL-MCNC: 0.5 MG/DL (ref 0.2–1)
BUN SERPL-MCNC: 14 MG/DL (ref 5–25)
CALCIUM SERPL-MCNC: 9 MG/DL
CHLORIDE SERPL-SCNC: 101 MMOL/L (ref 100–108)
CO2 SERPL-SCNC: 30 MMOL/L (ref 21–32)
CREAT SERPL-MCNC: 0.82 MG/DL (ref 0.6–1.3)
EOSINOPHIL # BLD AUTO: 0.14 THOUSAND/ΜL (ref 0–0.61)
EOSINOPHIL NFR BLD AUTO: 2 % (ref 0–6)
ERYTHROCYTE [DISTWIDTH] IN BLOOD BY AUTOMATED COUNT: 13.5 % (ref 11.6–15.1)
GFR SERPL CREATININE-BSD FRML MDRD: 105 ML/MIN/1.73SQ M
GLUCOSE SERPL-MCNC: 122 MG/DL (ref 65–140)
HCT VFR BLD AUTO: 44.8 % (ref 36.5–49.3)
HGB BLD-MCNC: 15.5 G/DL (ref 12–17)
INR PPP: 0.94 (ref 0.86–1.16)
LYMPHOCYTES # BLD AUTO: 2 THOUSANDS/ΜL (ref 0.6–4.47)
LYMPHOCYTES NFR BLD AUTO: 35 % (ref 14–44)
MCH RBC QN AUTO: 30.8 PG (ref 26.8–34.3)
MCHC RBC AUTO-ENTMCNC: 34.6 G/DL (ref 31.4–37.4)
MCV RBC AUTO: 89 FL (ref 82–98)
MONOCYTES # BLD AUTO: 0.75 THOUSAND/ΜL (ref 0.17–1.22)
MONOCYTES NFR BLD AUTO: 13 % (ref 4–12)
NEUTROPHILS # BLD AUTO: 2.89 THOUSANDS/ΜL (ref 1.85–7.62)
NEUTS SEG NFR BLD AUTO: 50 % (ref 43–75)
PLATELET # BLD AUTO: 142 THOUSANDS/UL (ref 149–390)
PMV BLD AUTO: 10.2 FL (ref 8.9–12.7)
POTASSIUM SERPL-SCNC: 4.2 MMOL/L (ref 3.5–5.3)
PROT SERPL-MCNC: 7.7 G/DL (ref 6.4–8.2)
PROTHROMBIN TIME: 12.5 SECONDS (ref 12.1–14.4)
RBC # BLD AUTO: 5.04 MILLION/UL (ref 3.88–5.62)
SODIUM SERPL-SCNC: 138 MMOL/L (ref 136–145)
WBC # BLD AUTO: 5.78 THOUSAND/UL (ref 4.31–10.16)

## 2018-04-14 PROCEDURE — 85610 PROTHROMBIN TIME: CPT | Performed by: EMERGENCY MEDICINE

## 2018-04-14 PROCEDURE — 80053 COMPREHEN METABOLIC PANEL: CPT | Performed by: EMERGENCY MEDICINE

## 2018-04-14 PROCEDURE — 99283 EMERGENCY DEPT VISIT LOW MDM: CPT

## 2018-04-14 PROCEDURE — 85025 COMPLETE CBC W/AUTO DIFF WBC: CPT | Performed by: EMERGENCY MEDICINE

## 2018-04-14 PROCEDURE — 85730 THROMBOPLASTIN TIME PARTIAL: CPT | Performed by: EMERGENCY MEDICINE

## 2018-04-14 PROCEDURE — 36415 COLL VENOUS BLD VENIPUNCTURE: CPT | Performed by: EMERGENCY MEDICINE

## 2018-04-14 NOTE — DISCHARGE INSTRUCTIONS
Avoid pressure to areas  See your Dr Monday for recheck and monitoring of your liver functions    Purpura   WHAT YOU NEED TO KNOW:   What are purpura? Purpura are purple or red spots on the skin or mucus membranes  Purpura happen when blood leaks from blood vessels and collects under the skin or mucus membrane  What causes purpura? · Low platelet (a blood cell that stops your body from bleeding) levels     · Platelets that do not work correctly    · Injury to blood vessels    · Fragile or weak blood vessels    · An allergic reaction to a blood transfusion    · Certain viral or bacterial infections, such as rubella or streptococcus  What increases my risk for purpura? Any condition or medicine that causes low platelet levels can increase your risk for purpura  Your risk is also increased if you have a condition, or take medicine, that prevents platelets from working correctly  · Hemophilia or thrombocytopenia    · Liver or renal disease    · Alcohol or drug abuse    · Pregnancy    · Not enough vitamin K or C    · Cancer or cancer treatment  What are the signs and symptoms of purpura? Purpura may happen anywhere in your body  They may be raised or flat, and different sizes  You may have other symptoms depending on what is causing your purpura  If purpura is caused by an infection, you may have a fever or pain in the infected body part  If purpura is caused by a bleeding problem, you may have bleeding in other parts of your body  How is the cause of purpura diagnosed? Your healthcare provider will examine you and ask about your symptoms  Tell him about any health condition you have that causes bleeding  Also tell him what medicines or supplements you take  You may need blood tests to count your platelets or time how fast your blood clots  You may also need blood tests to check for infection or other conditions that cause purpura  How is purpura treated? Medicine may be needed to treat an infection   Your blood thinner, aspirin, or other medicine may need to be stopped or changed  If you have a large amount of bleeding, you may need a blood transfusion, medicines, or surgery to stop the bleeding  What can I do to manage my symptoms? · Do not take NSAIDs, aspirin, or blood thinner medicine  These medicines can make purpura worse  Ask your healthcare provider how long you need to stop these medicines  · Protect your body from injury  Cuts or scrapes may cause bleeding that is difficult to control  Use an electric shaver  Wear gloves when you wash the dishes or garden  Be careful when you use knives or other sharp items  Always  wear a seatbelt  · Do not play contact sports  Contact sports such as football or boxing may cause injury or bleeding that is difficult to control  Ask your healthcare provider what activities are safe for you to do  · Control bleeding  Apply firm, steady pressure to cuts or scrapes  If possible, elevate the area above the level of your heart  If your nose bleeds, pinch the upper part of your nose and hold a tissue at the opening  Do this until the bleeding stops  Call 911 for any of the following:   · You have any of the following signs of a heart attack:      ¨ Squeezing, pressure, or pain in your chest that lasts longer than 5 minutes or returns    ¨ Discomfort or pain in your back, neck, jaw, stomach, or arm     ¨ Trouble breathing    ¨ Nausea or vomiting    ¨ Lightheadedness or a sudden cold sweat, especially with chest pain or trouble breathing    · You have any of the following signs of a stroke:      ¨ Numbness or drooping on one side of your face     ¨ Weakness in an arm or leg    ¨ Confusion or difficulty speaking    ¨ Dizziness, a severe headache, or vision loss  When should I seek immediate care? · You have bleeding that does not stop or a bruise that suddenly gets bigger  · You vomit blood or material that looks like coffee grounds       · Your arm or leg looks bigger than normal and feels warm, tender, or painful  · You suddenly feel lightheaded, dizzy, or weak  When should I contact my healthcare provider? · You have bleeding from your gums, mouth, or nose  · You have irregular or heavy menstrual bleeding  · You have blood in your urine or bowel movement  · You see more bruises or red or purple spots on your skin  · You have questions or concerns about your condition or care  CARE AGREEMENT:   You have the right to help plan your care  Learn about your health condition and how it may be treated  Discuss treatment options with your caregivers to decide what care you want to receive  You always have the right to refuse treatment  The above information is an  only  It is not intended as medical advice for individual conditions or treatments  Talk to your doctor, nurse or pharmacist before following any medical regimen to see if it is safe and effective for you  © 2017 [employer] Information is for End User's use only and may not be sold, redistributed or otherwise used for commercial purposes  All illustrations and images included in CareNotes® are the copyrighted property of A [employer]  or [employer]ain [employer]

## 2018-04-14 NOTE — ED PROVIDER NOTES
History  Chief Complaint   Patient presents with    Rash     Patient woke up with a red rash on his right hand radiating up his right arm  Pt gives hx of waking with red rash  Right hand- thru day has extended to forearm   Pt also noted small patch righ lateral chest -does not know how long that has been there  Described as tingling/burning sensation  No numbness  No loss of strength/function  No fevers/chills  No resp sx  Pt denies trauma - but relates arm was under girlfriend all night  History provided by:  Patient  Rash   Location:  Hand and shoulder/arm  Shoulder/arm rash location:  R forearm and R hand  Hand rash location:  Dorsum of R hand  Progression:  Worsening  Chronicity:  New  Context: not animal contact, not chemical exposure, not exposure to similar rash, not food, not insect bite/sting, not medications, not new detergent/soap, not plant contact, not sick contacts and not sun exposure    Relieved by:  None tried  Associated symptoms: no abdominal pain, no diarrhea, no fatigue, no fever, no headaches, no hoarse voice, no induration, no joint pain, no myalgias, no nausea, no periorbital edema, no shortness of breath, no sore throat, no throat swelling, no tongue swelling, no URI, not vomiting and not wheezing        Prior to Admission Medications   Prescriptions Last Dose Informant Patient Reported? Taking?    Ciprofloxacin HCl 0 2 % otic solution   Yes No   Sig: Administer into ears   atorvastatin (LIPITOR) 20 mg tablet   Yes No   Sig: Take 1 tablet by mouth   carbamide peroxide (DEBROX) 6 5 % otic solution   Yes No   Sig: Administer into ears   ciprofloxacin-dexamethasone (CIPRODEX) otic suspension   No No   Sig: Administer 4 drops to the right ear 2 (two) times a day   ibuprofen (MOTRIN) 600 mg tablet   No No   Sig: Take 1 tablet by mouth every 8 (eight) hours as needed for mild pain or moderate pain for up to 5 days   mupirocin (BACTROBAN) 2 % ointment   Yes No   Sig: Apply topically 3 (three) times a day   valsartan (DIOVAN) 160 mg tablet   Yes No   Sig: Take 1 tablet by mouth daily      Facility-Administered Medications: None       Past Medical History:   Diagnosis Date    Diabetes mellitus ([facility] [address] )     Hypertension        History reviewed  No pertinent surgical history  History reviewed  No pertinent family history  I have reviewed and agree with the history as documented  Social History   Substance Use Topics    Smoking status: Light Tobacco Smoker     Packs/day: 0 20     Types: Cigarettes    Smokeless tobacco: Never Used    Alcohol use Yes      Comment: 1/2 of a fifth a day        Review of Systems   Constitutional: Negative for activity change, appetite change, chills, diaphoresis, fatigue and fever  HENT: Negative  Negative for congestion, drooling, facial swelling, hoarse voice, mouth sores, sore throat, trouble swallowing and voice change  Eyes: Negative  Negative for pain, redness and visual disturbance  Respiratory: Negative  Negative for cough, choking, chest tightness, shortness of breath, wheezing and stridor  Cardiovascular: Negative  Negative for chest pain, palpitations and leg swelling  Gastrointestinal: Negative  Negative for abdominal pain, blood in stool, diarrhea, nausea and vomiting  Endocrine: Negative  Genitourinary: Negative  Negative for dysuria, flank pain and hematuria  Musculoskeletal: Negative  Negative for arthralgias, joint swelling, myalgias, neck pain and neck stiffness  Skin: Positive for rash  Negative for wound  Neurological: Negative  Negative for dizziness, tremors, syncope, facial asymmetry, weakness, light-headedness, numbness and headaches  Psychiatric/Behavioral: Negative  All other systems reviewed and are negative        Physical Exam  ED Triage Vitals [04/14/18 1622]   Temperature Pulse Respirations Blood Pressure SpO2   97 9 °F (36 6 °C) 87 18 (!) 173/99 96 %      Temp Source Heart Rate Source Patient Position - Orthostatic VS BP Location FiO2 (%)   Temporal Monitor Sitting Right arm --      Pain Score       No Pain           Orthostatic Vital Signs  Vitals:    04/14/18 1622   BP: (!) 173/99   Pulse: 87   Patient Position - Orthostatic VS: Sitting       Physical Exam   Constitutional: He is oriented to person, place, and time  He appears well-developed and well-nourished  He is active and cooperative  Non-toxic appearance  He does not have a sickly appearance  He does not appear ill  No distress  HENT:   Head: Normocephalic and atraumatic  Right Ear: Hearing normal    Left Ear: Hearing normal    Nose: Nose normal    Mouth/Throat: Oropharynx is clear and moist  Mucous membranes are not dry and not cyanotic  No oropharyngeal exudate, posterior oropharyngeal edema or posterior oropharyngeal erythema  Eyes: Conjunctivae, EOM and lids are normal  Pupils are equal, round, and reactive to light  Right conjunctiva is not injected  Left conjunctiva is not injected  Neck: Normal range of motion  Neck supple  No JVD present  No spinous process tenderness and no muscular tenderness present  Cardiovascular: Normal rate, regular rhythm, intact distal pulses and normal pulses  No extrasystoles are present  No perf edema or calf tenderness   Pulmonary/Chest: Effort normal  No accessory muscle usage or stridor  No tachypnea  No respiratory distress  He has no decreased breath sounds  He has no wheezes  He has no rhonchi  He has no rales  Abdominal: Soft  Bowel sounds are normal  There is no tenderness  There is no rigidity, no guarding and no CVA tenderness  Neurological: He is alert and oriented to person, place, and time  He has normal strength and normal reflexes  No cranial nerve deficit  Skin: Skin is warm, dry and intact  Capillary refill takes less than 2 seconds  Petechiae, purpura and rash noted  No abrasion and no bruising noted  He is not diaphoretic  No cyanosis  No pallor  Petechial type rash dorsum of hand and fore arm  Does not shannan  Not rasied  No induration or lesions   Psychiatric: He has a normal mood and affect  His speech is normal and behavior is normal  Cognition and memory are normal    Vitals reviewed  ED Medications  Medications - No data to display    Diagnostic Studies  Results Reviewed     Procedure Component Value Units Date/Time    Comprehensive metabolic panel [[id number]]  (Abnormal) Collected:  04/14/18 1702    Lab Status:  Final result Specimen:  Blood from Arm, Left Updated:  04/14/18 1724     Sodium 138 mmol/L      Potassium 4 2 mmol/L      Chloride 101 mmol/L      CO2 30 mmol/L      Anion Gap 7 mmol/L      BUN 14 mg/dL      Creatinine 0 82 mg/dL      Glucose 122 mg/dL      Calcium 9 0 mg/dL      AST 55 (H) U/L       (H) U/L      Alkaline Phosphatase 86 U/L      Total Protein 7 7 g/dL      Albumin 3 9 g/dL      Total Bilirubin 0 50 mg/dL      eGFR 105 ml/min/1 73sq m     Narrative:         National Kidney Disease Education Program recommendations are as follows:  GFR calculation is accurate only with a steady state creatinine  Chronic Kidney disease less than 60 ml/min/1 73 sq  meters  Kidney failure less than 15 ml/min/1 73 sq  meters      Alveda Kos [[id number]]  (Normal) Collected:  04/14/18 1702    Lab Status:  Final result Specimen:  Blood from Arm, Left Updated:  04/14/18 1718     Protime 12 5 seconds      INR 0 94    APTT [[id number]]  (Normal) Collected:  04/14/18 1702    Lab Status:  Final result Specimen:  Blood from Arm, Left Updated:  04/14/18 1718     PTT 27 seconds     CBC and differential [[id number]]  (Abnormal) Collected:  04/14/18 1702    Lab Status:  Final result Specimen:  Blood from Arm, Left Updated:  04/14/18 1709     WBC 5 78 Thousand/uL      RBC 5 04 Million/uL      Hemoglobin 15 5 g/dL      Hematocrit 44 8 %      MCV 89 fL      MCH 30 8 pg      MCHC 34 6 g/dL      RDW 13 5 %      MPV 10 2 fL      Platelets 232 (L) Thousands/uL Neutrophils Relative 50 %      Lymphocytes Relative 35 %      Monocytes Relative 13 (H) %      Eosinophils Relative 2 %      Basophils Relative 0 %      Neutrophils Absolute 2 89 Thousands/µL      Lymphocytes Absolute 2 00 Thousands/µL      Monocytes Absolute 0 75 Thousand/µL      Eosinophils Absolute 0 14 Thousand/µL      Basophils Absolute 0 00 Thousands/µL                  No orders to display              Procedures  Procedures       Phone Contacts  ED Phone Contact    ED Course  ED Course as of Apr 16 1544   Sat Apr 14, 2018   1731 AST: (!) 55   1731 ALT: (!) 113   1731 INR: 0 94   1731 PTT: 27   1731 Protime: 12 5   1731 WBC: 5 78   1731 Hemoglobin: 15 5   1731 Hematocrit: 44 8   1748 Discussed possible from pressure and also discussed LFT elevation and will need further monitoring   Will DC     1759 Platelets: (!) 434       Pt admits daily ETOH use                        MDM  CritCare Time    Disposition  Final diagnoses:   Rash and nonspecific skin eruption   Petechial eruption     Time reflects when diagnosis was documented in both MDM as applicable and the Disposition within this note     Time User Action Codes Description Comment    4/14/2018  5:55 PM Emerson Ocasio Add [R21] Rash and nonspecific skin eruption     4/14/2018  5:57 PM Emerson Ocasio Add [R23 3] Petechial eruption       ED Disposition     ED Disposition Condition Comment    Discharge  [place]  discharge to home/self care      Condition at discharge: Stable        Follow-up Information     Follow up With Specialties Details Why [address], DO Family Medicine In 3 days  [address]  [facility]  [phone]          Discharge Medication List as of 4/14/2018  5:58 PM      CONTINUE these medications which have NOT CHANGED    Details   atorvastatin (LIPITOR) 20 mg tablet Take 1 tablet by mouth, Starting Mon 8/24/2015, Historical Med      carbamide peroxide (DEBROX) 6 5 % otic solution Administer into ears, Starting Fri 1/19/2018, Historical Med      Ciprofloxacin HCl 0 2 % otic solution Administer into ears, Starting Tue 1/9/2018, Historical Med      ciprofloxacin-dexamethasone (CIPRODEX) otic suspension Administer 4 drops to the right ear 2 (two) times a day, Starting Wed 1/10/2018, Print      ibuprofen (MOTRIN) 600 mg tablet Take 1 tablet by mouth every 8 (eight) hours as needed for mild pain or moderate pain for up to 5 days, Starting Wed 1/10/2018, Until Mon 1/15/2018, Print      mupirocin (BACTROBAN) 2 % ointment Apply topically 3 (three) times a day, Historical Med      valsartan (DIOVAN) 160 mg tablet Take 1 tablet by mouth daily, Starting Mon 6/27/2016, Historical Med           No discharge procedures on file      ED Provider  Electronically Signed by           Mona Farrell,   04/16/18 8099

## 2018-05-18 ENCOUNTER — OFFICE VISIT (OUTPATIENT)
Dept: FAMILY MEDICINE CLINIC | Facility: CLINIC | Age: 48
End: 2018-05-18
Payer: COMMERCIAL

## 2018-05-18 VITALS
BODY MASS INDEX: 27.52 KG/M2 | SYSTOLIC BLOOD PRESSURE: 168 MMHG | DIASTOLIC BLOOD PRESSURE: 98 MMHG | HEIGHT: 68 IN | WEIGHT: 181.6 LBS

## 2018-05-18 DIAGNOSIS — E11.9 TYPE 2 DIABETES MELLITUS WITHOUT COMPLICATION, WITHOUT LONG-TERM CURRENT USE OF INSULIN (HCC): Primary | ICD-10-CM

## 2018-05-18 DIAGNOSIS — I10 ESSENTIAL HYPERTENSION: ICD-10-CM

## 2018-05-18 PROCEDURE — 3008F BODY MASS INDEX DOCD: CPT | Performed by: PHYSICIAN ASSISTANT

## 2018-05-18 PROCEDURE — 99214 OFFICE O/P EST MOD 30 MIN: CPT | Performed by: PHYSICIAN ASSISTANT

## 2018-05-18 PROCEDURE — 4010F ACE/ARB THERAPY RXD/TAKEN: CPT | Performed by: PHYSICIAN ASSISTANT

## 2018-05-18 RX ORDER — VALSARTAN 160 MG/1
160 TABLET ORAL DAILY
Qty: 30 TABLET | Refills: 2 | Status: SHIPPED | OUTPATIENT
Start: 2018-05-18 | End: 2018-12-14 | Stop reason: ALTCHOICE

## 2018-05-18 NOTE — PROGRESS NOTES
Assessment/Plan:    Will restart Lilly Castellanos' medications and check labs at his earliest convenience  Return in 3 months for follow up or sooner if needed  Monitor BP at home and let us know if consistently >140/90  Diagnoses and all orders for this visit:    Type 2 diabetes mellitus without complication, without long-term current use of insulin (HCC)  -     Lipid Panel with Direct LDL reflex; Future  -     HEMOGLOBIN A1C W/ EAG ESTIMATION; Future  -     Microalbumin / creatinine urine ratio  -     Canagliflozin-Metformin HCl (INVOKAMET)  MG TABS; Take 1 tablet by mouth 2 (two) times a day    Essential hypertension  -     CBC; Future  -     Comprehensive metabolic panel; Future  -     Lipid Panel with Direct LDL reflex; Future  -     TSH, 3rd generation with T4 reflex; Future  -     valsartan (DIOVAN) 160 mg tablet; Take 1 tablet (160 mg total) by mouth daily          Subjective:      Patient ID: Blanca Ledezma  is a 49 y o  male  Lilly Castellanos is here to restart his HTN and DM2 medications  His blood pressure has been >170/100 on average  He stopped taking his medications 1 year ago after getting dropped from his wife's insurance  Now, he was able to get his own insurance  He does not check blood glucose  He used to take Invokamet 50/500 BID and would like to restart the medication  The following portions of the patient's history were reviewed and updated as appropriate:   He  has a past medical history of Diabetes mellitus ([address] ) and Hypertension  He   Patient Active Problem List    Diagnosis Date Noted    Right otitis externa 01/26/2018    Impacted cerumen of left ear 01/26/2018    Secondary male hypogonadism 06/27/2016    Dyslipidemia 09/03/2015    Type 2 diabetes mellitus without complication, without long-term current use of insulin ([address] ) 08/19/2015    Essential hypertension 03/22/2013     He  has no past surgical history on file    His family history includes Diabetes in his maternal uncle; Heart disease in his father and maternal uncle; Hypertension in his father and mother  He  reports that he has been smoking Cigarettes  He has been smoking about 0 20 packs per day  He has never used smokeless tobacco  He reports that he drinks alcohol  He reports that he does not use drugs  Current Outpatient Prescriptions   Medication Sig Dispense Refill    Canagliflozin-Metformin HCl (INVOKAMET)  MG TABS Take 1 tablet by mouth 2 (two) times a day 60 tablet 2    valsartan (DIOVAN) 160 mg tablet Take 1 tablet (160 mg total) by mouth daily 30 tablet 2     No current facility-administered medications for this visit  Current Outpatient Prescriptions on File Prior to Visit   Medication Sig    [DISCONTINUED] atorvastatin (LIPITOR) 20 mg tablet Take 1 tablet by mouth    [DISCONTINUED] carbamide peroxide (DEBROX) 6 5 % otic solution Administer into ears    [DISCONTINUED] Ciprofloxacin HCl 0 2 % otic solution Administer into ears    [DISCONTINUED] ciprofloxacin-dexamethasone (CIPRODEX) otic suspension Administer 4 drops to the right ear 2 (two) times a day    [DISCONTINUED] ibuprofen (MOTRIN) 600 mg tablet Take 1 tablet by mouth every 8 (eight) hours as needed for mild pain or moderate pain for up to 5 days    [DISCONTINUED] mupirocin (BACTROBAN) 2 % ointment Apply topically 3 (three) times a day    [DISCONTINUED] valsartan (DIOVAN) 160 mg tablet Take 1 tablet by mouth daily     No current facility-administered medications on file prior to visit  He is allergic to sulfa antibiotics       Review of Systems   Constitutional: Negative for activity change, appetite change, chills, fatigue and fever  HENT: Negative for congestion, postnasal drip, rhinorrhea, sinus pain, sinus pressure, sore throat and tinnitus  Eyes: Negative for visual disturbance  Respiratory: Negative for cough, shortness of breath and wheezing  Cardiovascular: Negative for chest pain and palpitations     Gastrointestinal: Negative for abdominal pain, constipation, diarrhea, nausea and vomiting  Endocrine: Positive for polydipsia and polyuria  Genitourinary: Negative for dysuria  Musculoskeletal: Negative for arthralgias, back pain, gait problem and myalgias  Skin: Negative for rash  Neurological: Negative for dizziness, syncope, light-headedness and headaches  Objective:      /98 (BP Location: Left arm, Patient Position: Sitting)   Ht 5' 8" (1 727 m)   Wt 82 4 kg (181 lb 9 6 oz)   BMI 27 61 kg/m²          Physical Exam   Constitutional: He is oriented to person, place, and time  He appears well-developed and well-nourished  No distress  HENT:   Head: Normocephalic and atraumatic  Right Ear: Hearing, tympanic membrane, external ear and ear canal normal    Left Ear: Hearing, tympanic membrane, external ear and ear canal normal    Mouth/Throat: Uvula is midline, oropharynx is clear and moist and mucous membranes are normal    Eyes: Conjunctivae are normal  Right eye exhibits no discharge  Left eye exhibits no discharge  No scleral icterus  Neck: Neck supple  Carotid bruit is not present  No thyromegaly present  Cardiovascular: Normal rate and regular rhythm  No murmur heard  Pulmonary/Chest: Effort normal and breath sounds normal  No respiratory distress  He has no wheezes  Musculoskeletal: He exhibits no edema  Lymphadenopathy:     He has no cervical adenopathy  Neurological: He is alert and oriented to person, place, and time  Skin: Skin is warm and dry  No rash noted  He is not diaphoretic  No erythema  Psychiatric: He has a normal mood and affect  His behavior is normal  Judgment and thought content normal    Vitals reviewed

## 2018-05-22 ENCOUNTER — TELEPHONE (OUTPATIENT)
Dept: FAMILY MEDICINE CLINIC | Facility: CLINIC | Age: 48
End: 2018-05-22

## 2018-05-22 NOTE — TELEPHONE ENCOUNTER
Pt states he went to the pharmacy to get the meds you prescribed him on Friday but they told him they need prior auth

## 2018-12-13 ENCOUNTER — HOSPITAL ENCOUNTER (EMERGENCY)
Facility: HOSPITAL | Age: 48
Discharge: HOME | End: 2018-12-13
Attending: EMERGENCY MEDICINE | Admitting: EMERGENCY MEDICINE
Payer: COMMERCIAL

## 2018-12-13 ENCOUNTER — APPOINTMENT (EMERGENCY)
Dept: CT IMAGING | Facility: HOSPITAL | Age: 48
End: 2018-12-13
Payer: COMMERCIAL

## 2018-12-13 VITALS
HEART RATE: 84 BPM | DIASTOLIC BLOOD PRESSURE: 84 MMHG | RESPIRATION RATE: 20 BRPM | BODY MASS INDEX: 25.76 KG/M2 | TEMPERATURE: 99.5 F | SYSTOLIC BLOOD PRESSURE: 157 MMHG | WEIGHT: 170 LBS | OXYGEN SATURATION: 96 % | HEIGHT: 68 IN

## 2018-12-13 DIAGNOSIS — L03.90 CELLULITIS: Primary | ICD-10-CM

## 2018-12-13 LAB
ALBUMIN SERPL BCP-MCNC: 4.1 G/DL (ref 3.5–5)
ALP SERPL-CCNC: 112 U/L (ref 46–116)
ALT SERPL W P-5'-P-CCNC: 58 U/L (ref 12–78)
ANION GAP SERPL CALCULATED.3IONS-SCNC: 12 MMOL/L (ref 4–13)
APTT PPP: 28 SECONDS (ref 26–38)
AST SERPL W P-5'-P-CCNC: 38 U/L (ref 5–45)
BASOPHILS # BLD AUTO: 0.02 THOUSANDS/ΜL (ref 0–0.1)
BASOPHILS NFR BLD AUTO: 0 % (ref 0–1)
BILIRUB SERPL-MCNC: 1 MG/DL (ref 0.2–1)
BUN SERPL-MCNC: 16 MG/DL (ref 5–25)
CALCIUM SERPL-MCNC: 9.5 MG/DL (ref 8.3–10.1)
CHLORIDE SERPL-SCNC: 100 MMOL/L (ref 100–108)
CO2 SERPL-SCNC: 28 MMOL/L (ref 21–32)
CREAT SERPL-MCNC: 0.7 MG/DL (ref 0.6–1.3)
EOSINOPHIL # BLD AUTO: 0.09 THOUSAND/ΜL (ref 0–0.61)
EOSINOPHIL NFR BLD AUTO: 1 % (ref 0–6)
ERYTHROCYTE [DISTWIDTH] IN BLOOD BY AUTOMATED COUNT: 12.5 % (ref 11.6–15.1)
ETHANOL SERPL-MCNC: 24 MG/DL (ref 0–3)
GFR SERPL CREATININE-BSD FRML MDRD: 111 ML/MIN/1.73SQ M
GLUCOSE SERPL-MCNC: 124 MG/DL (ref 65–140)
HCT VFR BLD AUTO: 49.9 % (ref 36.5–49.3)
HGB BLD-MCNC: 16.9 G/DL (ref 12–17)
IMM GRANULOCYTES # BLD AUTO: 0.05 THOUSAND/UL (ref 0–0.2)
IMM GRANULOCYTES NFR BLD AUTO: 1 % (ref 0–2)
INR PPP: 0.93 (ref 0.86–1.17)
LYMPHOCYTES # BLD AUTO: 1.5 THOUSANDS/ΜL (ref 0.6–4.47)
LYMPHOCYTES NFR BLD AUTO: 15 % (ref 14–44)
MCH RBC QN AUTO: 30.7 PG (ref 26.8–34.3)
MCHC RBC AUTO-ENTMCNC: 33.9 G/DL (ref 31.4–37.4)
MCV RBC AUTO: 91 FL (ref 82–98)
MONOCYTES # BLD AUTO: 0.92 THOUSAND/ΜL (ref 0.17–1.22)
MONOCYTES NFR BLD AUTO: 9 % (ref 4–12)
NEUTROPHILS # BLD AUTO: 7.16 THOUSANDS/ΜL (ref 1.85–7.62)
NEUTS SEG NFR BLD AUTO: 74 % (ref 43–75)
NRBC BLD AUTO-RTO: 0 /100 WBCS
PLATELET # BLD AUTO: 162 THOUSANDS/UL (ref 149–390)
PMV BLD AUTO: 9.6 FL (ref 8.9–12.7)
POTASSIUM SERPL-SCNC: 5 MMOL/L (ref 3.5–5.3)
PROT SERPL-MCNC: 8.3 G/DL (ref 6.4–8.2)
PROTHROMBIN TIME: 12 SECONDS (ref 11.8–14.2)
RBC # BLD AUTO: 5.5 MILLION/UL (ref 3.88–5.62)
SODIUM SERPL-SCNC: 140 MMOL/L (ref 136–145)
WBC # BLD AUTO: 9.74 THOUSAND/UL (ref 4.31–10.16)

## 2018-12-13 PROCEDURE — 70491 CT SOFT TISSUE NECK W/DYE: CPT

## 2018-12-13 PROCEDURE — 99284 EMERGENCY DEPT VISIT MOD MDM: CPT

## 2018-12-13 PROCEDURE — 96361 HYDRATE IV INFUSION ADD-ON: CPT

## 2018-12-13 PROCEDURE — 96360 HYDRATION IV INFUSION INIT: CPT

## 2018-12-13 PROCEDURE — 80053 COMPREHEN METABOLIC PANEL: CPT | Performed by: EMERGENCY MEDICINE

## 2018-12-13 PROCEDURE — 36415 COLL VENOUS BLD VENIPUNCTURE: CPT | Performed by: EMERGENCY MEDICINE

## 2018-12-13 PROCEDURE — 85025 COMPLETE CBC W/AUTO DIFF WBC: CPT | Performed by: EMERGENCY MEDICINE

## 2018-12-13 PROCEDURE — 85730 THROMBOPLASTIN TIME PARTIAL: CPT | Performed by: EMERGENCY MEDICINE

## 2018-12-13 PROCEDURE — 80320 DRUG SCREEN QUANTALCOHOLS: CPT | Performed by: EMERGENCY MEDICINE

## 2018-12-13 PROCEDURE — 85610 PROTHROMBIN TIME: CPT | Performed by: EMERGENCY MEDICINE

## 2018-12-13 RX ORDER — CLINDAMYCIN HYDROCHLORIDE 150 MG/1
600 CAPSULE ORAL ONCE
Status: COMPLETED | OUTPATIENT
Start: 2018-12-13 | End: 2018-12-13

## 2018-12-13 RX ORDER — CLINDAMYCIN HYDROCHLORIDE 150 MG/1
150 CAPSULE ORAL EVERY 6 HOURS
Qty: 28 CAPSULE | Refills: 0 | Status: SHIPPED | OUTPATIENT
Start: 2018-12-13 | End: 2018-12-20

## 2018-12-13 RX ORDER — CLINDAMYCIN PHOSPHATE 600 MG/50ML
600 INJECTION INTRAVENOUS ONCE
Status: DISCONTINUED | OUTPATIENT
Start: 2018-12-13 | End: 2018-12-13

## 2018-12-13 RX ADMIN — IOHEXOL 85 ML: 350 INJECTION, SOLUTION INTRAVENOUS at 14:43

## 2018-12-13 RX ADMIN — CLINDAMYCIN HYDROCHLORIDE 600 MG: 150 CAPSULE ORAL at 16:54

## 2018-12-13 RX ADMIN — SODIUM CHLORIDE 1000 ML: 0.9 INJECTION, SOLUTION INTRAVENOUS at 13:41
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
## 2018-12-13 NOTE — DISCHARGE INSTRUCTIONS
Cellulitis   WHAT YOU NEED TO KNOW:   Cellulitis is a skin infection caused by bacteria  Cellulitis may go away on its own or you may need treatment  Your healthcare provider may draw a [place] around the outside edges of your cellulitis  If your cellulitis spreads, your healthcare provider will see it outside of the [place]  DISCHARGE INSTRUCTIONS:   Call 911 if:   · You have sudden trouble breathing or chest pain  Return to the emergency department if:   · Your wound gets larger and more painful  · You feel a crackling under your skin when you touch it  · You have purple dots or bumps on your skin, or you see bleeding under your skin  · You have new swelling and pain in your legs  · The red, warm, swollen area gets larger  · You see red streaks coming from the infected area  Contact your healthcare provider if:   · You have a fever  · Your fever or pain does not go away or gets worse  · The area does not get smaller after 2 days of antibiotics  · Your skin is flaking or peeling off  · You have questions or concerns about your condition or care  Medicines:   · Antibiotics  help treat the bacterial infection  · NSAIDs , such as ibuprofen, help decrease swelling, pain, and fever  NSAIDs can cause stomach bleeding or kidney problems in certain people  If you take blood thinner medicine, always ask if NSAIDs are safe for you  Always read the medicine label and follow directions  Do not give these medicines to children under 10months of age without direction from your child's healthcare provider  · Acetaminophen  decreases pain and fever  It is available without a doctor's order  Ask how much to take and how often to take it  Follow directions  Read the labels of all other medicines you are using to see if they also contain acetaminophen, or ask your doctor or pharmacist  Acetaminophen can cause liver damage if not taken correctly   Do not use more than 4 grams (4,000 milligrams) total of acetaminophen in one day  · Take your medicine as directed  Contact your healthcare provider if you think your medicine is not helping or if you have side effects  Tell him or her if you are allergic to any medicine  Keep a list of the medicines, vitamins, and herbs you take  Include the amounts, and when and why you take them  Bring the list or the pill bottles to follow-up visits  Carry your medicine list with you in case of an emergency  Self-care:   · Elevate the area above the level of your heart  as often as you can  This will help decrease swelling and pain  Prop the area on pillows or blankets to keep it elevated comfortably  · Clean the area daily until the wound scabs over  Gently wash the area with soap and water  Pat dry  Use dressings as directed  · Place cool or warm, wet cloths on the area as directed  Use clean cloths and clean water  Leave it on the area until the cloth is room temperature  Pat the area dry with a clean, dry cloth  The cloths may help decrease pain  Prevent cellulitis:   · Do not scratch bug bites or areas of injury  You increase your risk for cellulitis by scratching these areas  · Do not share personal items, such as towels, clothing, and razors  · Clean exercise equipment  with germ-killing  before and after you use it  · Wash your hands often  Use soap and water  Wash your hands after you use the bathroom, change a child's diapers, or sneeze  Wash your hands before you prepare or eat food  Use lotion to prevent dry, cracked skin  · Wear pressure stockings as directed  You may be told to wear the stockings if you have peripheral edema  The stockings improve blood flow and decrease swelling  · Treat athlete's foot  This can help prevent the spread of a bacterial skin infection  Follow up with your healthcare provider within 3 days, or as directed:   Your healthcare provider will check if your cellulitis is getting better  You may need different medicine  Write down your questions so you remember to ask them during your visits  © 2017 [address] [employer] [employer] Information is for End User's use only and may not be sold, redistributed or otherwise used for commercial purposes  All illustrations and images included in CareNotes® are the copyrighted property of A D A M , Inc  or Dain Hart  The above information is an  only  It is not intended as medical advice for individual conditions or treatments  Talk to your doctor, nurse or pharmacist before following any medical regimen to see if it is safe and effective for you

## 2018-12-14 ENCOUNTER — OFFICE VISIT (OUTPATIENT)
Dept: FAMILY MEDICINE CLINIC | Facility: CLINIC | Age: 48
End: 2018-12-14
Payer: COMMERCIAL

## 2018-12-14 VITALS
HEIGHT: 68 IN | SYSTOLIC BLOOD PRESSURE: 198 MMHG | BODY MASS INDEX: 26.25 KG/M2 | WEIGHT: 173.2 LBS | DIASTOLIC BLOOD PRESSURE: 106 MMHG

## 2018-12-14 DIAGNOSIS — K13.70 ORAL MUCOSAL LESION: Primary | ICD-10-CM

## 2018-12-14 DIAGNOSIS — E11.8 TYPE 2 DIABETES MELLITUS WITH COMPLICATION, UNSPECIFIED WHETHER LONG TERM INSULIN USE: ICD-10-CM

## 2018-12-14 DIAGNOSIS — K13.79 ORAL BLEEDING: ICD-10-CM

## 2018-12-14 DIAGNOSIS — I10 ESSENTIAL HYPERTENSION: ICD-10-CM

## 2018-12-14 PROBLEM — E11.9 DIABETES MELLITUS (HCC): Status: RESOLVED | Noted: 2018-12-14 | Resolved: 2018-12-14

## 2018-12-14 LAB — SL AMB POCT HEMOGLOBIN AIC: 6.2

## 2018-12-14 PROCEDURE — 99214 OFFICE O/P EST MOD 30 MIN: CPT | Performed by: PHYSICIAN ASSISTANT

## 2018-12-14 PROCEDURE — 83036 HEMOGLOBIN GLYCOSYLATED A1C: CPT | Performed by: PHYSICIAN ASSISTANT

## 2018-12-14 RX ORDER — LOSARTAN POTASSIUM AND HYDROCHLOROTHIAZIDE 25; 100 MG/1; MG/1
1 TABLET ORAL DAILY
Qty: 30 TABLET | Refills: 2 | Status: SHIPPED | OUTPATIENT
Start: 2018-12-14

## 2018-12-14 NOTE — PROGRESS NOTES
Assessment/Plan:    Based on exam, I am concerned primarly for oral infection vs  Oral CA  He is both a smoker and chewing tobacco user, along with alcohol user  Advised him to stop all of these as they are risk factors to develop oral cancer  I did tell him that the fact that clindamycin seemed to improve his symptoms such a great deal over night made me believe this is more of an oral infection  However, due to his risk factors, will need to have asap ENT evaluation to rule out oral CA  Also, he stopped taking invokana as well as valsartan months ago due to insurance issues  His a1c today was <7%  Ok to continue without invokana and will monitor his sugars  For BP, will start on losartan hctz  I've asked him to monitor BP at home or stop in our office to have it monitored over the next 1-2 weeks to ensure proper BP control  Diagnoses and all orders for this visit:    Oral mucosal lesion  -     Ambulatory Referral to Otolaryngology; Future    Type 2 diabetes mellitus with complication, unspecified whether long term insulin use (HCC)  -     POCT hemoglobin A1c    Oral bleeding  -     Ambulatory Referral to Otolaryngology; Future    Essential hypertension  -     losartan-hydrochlorothiazide (HYZAAR) 100-25 MG per tablet; Take 1 tablet by mouth daily          Subjective:      Patient ID: Vidhi Wheeler  is a 51 y o  male  Viva Reasons is here today to follow up from the ER  Was there yesterday due to active bleeding from his mouth  Was spitting out both straight and clotted blood  Denies having any trauma to his mouth  He woke up yesterday morning with blood over his face from his mouth  Denies pain in mouth  Also states he had a lot of swelling along his L jaw line/neck  ER diagnosed him with cellulitis and started him on Clindamycin  Today, he states his swelling is at least 50% improved  He still has blood tinged saliva, but he cannot find where the blood is actively coming from   He does not have any teeth as they were all extracted  The following portions of the patient's history were reviewed and updated as appropriate:   He  has a past medical history of Bacterial pneumonia; Diabetes mellitus ([address] ); Hyperglycemia; Hypertension; Pneumonia; and Secondary male hypogonadism  He   Patient Active Problem List    Diagnosis Date Noted    Right otitis externa 01/26/2018    Impacted cerumen of left ear 01/26/2018    Secondary male hypogonadism 06/27/2016    Dyslipidemia 09/03/2015    Essential hypertension 03/22/2013     He  has a past surgical history that includes Tooth extraction  His family history includes Diabetes in his maternal uncle; Heart disease in his father and maternal uncle; Hypertension in his father, maternal uncle, and mother  He  reports that he has been smoking Cigarettes  He has been smoking about 1 00 pack per day  He has never used smokeless tobacco  He reports that he drinks alcohol  He reports that he does not use drugs  Current Outpatient Prescriptions   Medication Sig Dispense Refill    clindamycin (CLEOCIN) 150 mg capsule Take 1 capsule (150 mg total) by mouth every 6 (six) hours for 7 days 28 capsule 0    losartan-hydrochlorothiazide (HYZAAR) 100-25 MG per tablet Take 1 tablet by mouth daily 30 tablet 2     No current facility-administered medications for this visit        Current Outpatient Prescriptions on File Prior to Visit   Medication Sig    clindamycin (CLEOCIN) 150 mg capsule Take 1 capsule (150 mg total) by mouth every 6 (six) hours for 7 days    [DISCONTINUED] Canagliflozin-Metformin HCl (INVOKAMET)  MG TABS Take 1 tablet by mouth 2 (two) times a day    [DISCONTINUED] valsartan (DIOVAN) 160 mg tablet Take 1 tablet (160 mg total) by mouth daily (Patient not taking: Reported on 12/13/2018 )     Current Facility-Administered Medications on File Prior to Visit   Medication    [COMPLETED] clindamycin (CLEOCIN) capsule 600 mg    [COMPLETED] iohexol (OMNIPAQUE) 350 MG/ML injection (SINGLE-DOSE) 85 mL    [COMPLETED] sodium chloride 0 9 % bolus 1,000 mL    [DISCONTINUED] clindamycin (CLEOCIN) IVPB (premix) 600 mg     He is allergic to sulfa antibiotics       Review of Systems   Constitutional: Negative for activity change, appetite change, chills, diaphoresis, fatigue, fever and unexpected weight change  HENT: Negative for congestion, ear pain, mouth sores, postnasal drip, rhinorrhea, sinus pain, sinus pressure, sneezing, sore throat, tinnitus and voice change  Eyes: Negative for pain, redness and visual disturbance  Respiratory: Negative for cough, chest tightness, shortness of breath and wheezing  Cardiovascular: Negative for chest pain, palpitations and leg swelling  Gastrointestinal: Negative for abdominal pain, blood in stool, constipation, diarrhea, nausea and vomiting  Genitourinary: Negative for difficulty urinating, dysuria, frequency, hematuria and urgency  Musculoskeletal: Negative for arthralgias, back pain, gait problem, joint swelling, myalgias, neck pain and neck stiffness  Skin: Negative for color change, pallor, rash and wound  Neurological: Negative for dizziness, tremors, weakness, light-headedness and headaches  Psychiatric/Behavioral: Negative for dysphoric mood, self-injury, sleep disturbance and suicidal ideas  The patient is not nervous/anxious  Objective:      BP (!) 198/106 (BP Location: Left arm)   Ht 5' 8" (1 727 m)   Wt 78 6 kg (173 lb 3 2 oz)   BMI 26 33 kg/m²          Physical Exam   Constitutional: He is oriented to person, place, and time  He appears well-developed and well-nourished  No distress  HENT:   Head: Normocephalic and atraumatic  Right Ear: Hearing, tympanic membrane, external ear and ear canal normal    Left Ear: Hearing, tympanic membrane, external ear and ear canal normal    Mouth/Throat: Uvula is midline, oropharynx is clear and moist and mucous membranes are normal  No oropharyngeal exudate  Eyes: Conjunctivae are normal  Right eye exhibits no discharge  Left eye exhibits no discharge  No scleral icterus  Neck: Neck supple  Carotid bruit is not present  No thyromegaly present  Cardiovascular: Normal rate, regular rhythm and normal heart sounds  No murmur heard  Pulmonary/Chest: Effort normal and breath sounds normal  No respiratory distress  He has no wheezes  Abdominal: Soft  Bowel sounds are normal  He exhibits no distension and no mass  There is no tenderness  There is no rebound and no guarding  Musculoskeletal: Normal range of motion  He exhibits no edema or tenderness  Lymphadenopathy:     He has cervical adenopathy  Neurological: He is alert and oriented to person, place, and time  Skin: Skin is warm and dry  No rash noted  He is not diaphoretic  No erythema  Psychiatric: He has a normal mood and affect  His behavior is normal  Judgment and thought content normal    Vitals reviewed

## 2018-12-14 NOTE — LETTER
December 14, 2018     Patient: Michael Singh  YOB: 1970   Date of Visit: 12/14/2018       To Whom it May Concern:    Iliana Perkins is under my professional care  He was seen in my office on 12/14/2018  He may return to work on 12/15/18  If you have any questions or concerns, please don't hesitate to call           Sincerely,          Anna Villafuerte PA-C        CC: No Recipients

## 2018-12-19 DIAGNOSIS — K13.70 ORAL MUCOSAL LESION: Primary | ICD-10-CM

## 2018-12-21 ENCOUNTER — OFFICE VISIT (OUTPATIENT)
Dept: OTOLARYNGOLOGY | Facility: CLINIC | Age: 48
End: 2018-12-21
Payer: COMMERCIAL

## 2018-12-21 VITALS
DIASTOLIC BLOOD PRESSURE: 80 MMHG | HEIGHT: 68 IN | SYSTOLIC BLOOD PRESSURE: 140 MMHG | HEART RATE: 102 BPM | BODY MASS INDEX: 25.76 KG/M2 | WEIGHT: 170 LBS

## 2018-12-21 DIAGNOSIS — H61.23 BILATERAL IMPACTED CERUMEN: ICD-10-CM

## 2018-12-21 DIAGNOSIS — K13.21: Primary | ICD-10-CM

## 2018-12-21 DIAGNOSIS — Z72.0 TOBACCO ABUSE: ICD-10-CM

## 2018-12-21 PROCEDURE — 88342 IMHCHEM/IMCYTCHM 1ST ANTB: CPT | Performed by: PATHOLOGY

## 2018-12-21 PROCEDURE — 99243 OFF/OP CNSLTJ NEW/EST LOW 30: CPT | Performed by: OTOLARYNGOLOGY

## 2018-12-21 PROCEDURE — 88305 TISSUE EXAM BY PATHOLOGIST: CPT | Performed by: PATHOLOGY

## 2018-12-21 PROCEDURE — 69210 REMOVE IMPACTED EAR WAX UNI: CPT | Performed by: OTOLARYNGOLOGY

## 2018-12-21 PROCEDURE — 88312 SPECIAL STAINS GROUP 1: CPT | Performed by: PATHOLOGY

## 2018-12-21 PROCEDURE — 40808 BIOPSY OF MOUTH LESION: CPT | Performed by: OTOLARYNGOLOGY

## 2018-12-21 NOTE — LETTER
December 21, 2018     Reg Nicholson PA-C  [address] [place] [address] [id number]    Patient: Rasta Faustin  YOB: 1970   Date of Visit: 12/21/2018       Dear Dr Brenda Acuna: Thank you for referring Domo Batres to me for evaluation  Below are my notes for this consultation  If you have questions, please do not hesitate to call me  I look forward to following your patient along with you  Sincerely,        Yeison Powell MD        CC: No Recipients  Yeison Powell MD  12/21/2018  9:50 AM  Sign at close encounter  Consultation - Otolaryngology - Head and Neck Surgery  Facial Plastic and Reconstructive Surgery  Rasta Faustin  51 y o  male MRN: [id number]  Encounter: [id number]        Assessment/Plan:  1  Leukoplakia, gingiva     2  Tobacco abuse     3  Impacted cerumen of left ear         We recommend a biopsy of the oral lesion to check for malignancy  We discussed the possibility of malignancy  Discussed the risks of oral cancers including smoking, chewing tobacco use, and alcohol use  Encouraged quitting tobacco use and alcohol use  Follow up in 2 weeks to discuss the results of pathology and re evaluate for any changes  Cerumen impaction:  We discussed the nature of cerumen impaction  We discussed appropriate treat with hydrogen peroxide 4-5 drops once per week  We discussed discontinuing the use of any Q-Tips or other objects into the ear  Tobacco abuse: Discussed risks of ongoing cigarette smoking  Discussed the benefits of quitting immediately and prior to any surgery  Discussed options including support, quit date, medications, and family support  Patient voiced understanding and knowledge  Greater than 3 minutes were needed for discussion of tobacco dependence          History of Present Illness   Physician Requesting Consult: Reg Nicholson PA-C  Reason for Consult / Principal Problem: Oral lesion  HPI: Rasta Faustin  is a 51y o  year old male who presents with an oral lesion first noticed 1 week ago  Patient states he had bleeding from the oral lesion on  and was seen at the ED  CT in the ED showed left submandibular gland enlargement without sialoadenitis and increased size and enhancement of the sublingual gland  He was prescribed Clindamycin for suspected infection  He notes he smokes 1 pack per day and uses 1 5 cans of chewing tobacco per day  Also admits to daily heavy alcohol use  States he has had a roughly 10 lb weight loss over the past 1-2 months  Denies any voice changes, neck masses, odynophagia  Notes some left sided otalgia and decreased hearing  He does not regularly see a dentist      Review of systems:  [id number] was performed and negative except as above or otherwise noted in the medical record      Historical Information   Past Medical History:   Diagnosis Date    Bacterial pneumonia     Diabetes mellitus ([address] )     Hyperglycemia     Hypertension     Pneumonia     Secondary male hypogonadism      Past Surgical History:   Procedure Laterality Date    TOOTH EXTRACTION      last assessed: 18     Social History   History   Alcohol Use    Yes     Comment: 1/2 of a fifth a day     History   Drug Use No     History   Smoking Status    Heavy Tobacco Smoker    Packs/day: 1     Types: Cigarettes   Smokeless Tobacco    Never Used     Family History:   Family History   Problem Relation Age of Onset   [place] Hypertension Mother     Hypertension Father     Heart disease Father     Heart disease Maternal Uncle         cardiac disorder    Diabetes Maternal Uncle     Hypertension Maternal Uncle        Current Outpatient Prescriptions on File Prior to Visit   Medication Sig    losartan-hydrochlorothiazide (HYZAAR) 100-25 MG per tablet Take 1 tablet by mouth daily    [] clindamycin (CLEOCIN) 150 mg capsule Take 1 capsule (150 mg total) by mouth every 6 (six) hours for 7 days     No current facility-administered medications on file prior to visit  Allergies   Allergen Reactions    Sulfa Antibiotics Rash and Hives       Vitals:    12/21/18 0716   BP: 140/80   Pulse: 102       Physical Exam   Constitutional: Oriented to person, place, and time  Well-developed and well-nourished, no apparent distress, non-toxic appearance  Cooperative, able to hear and answer questions without difficulty  Voice: Normal voice quality  Head: Normocephalic, atraumatic  No scars, masses or lesions  Face: Symmetric, no edema, no sinus tenderness  Eyes: Vision grossly intact, extra-ocular movement intact  Ears: External ears normal  Cerumen impaction left EAC  Tympanic membranes intact with intact normal landmarks  No post-auricular erythema or tenderness  Nose: Septum intact, nares clear  Mucosa moist, turbinates well appearing  No crusting, polyps or discharge evident  Oral cavity: Dentition absent  Mucosa moist, lips without lesions or masses  Tongue mobile, floor of mouth soft and flat  Hard palate intact  Irregularly shaped white ulcerative plaque on mandibular left lingual gingiva approximately 2 cm distal to midline  Adjacent to where teeth 22-24 would be  Ulcer is approximately 1 5 cm in length and extends onto the FOM  Oropharynx: Uvula is midline, soft palate intact without lesion or mass  Oropharyngeal inlet without obstruction  Tonsils unremarkable  Posterior pharyngeal wall clear  No masses or lesions  Salivary glands:  Parotid glands and submandibular glands symmetric, no enlargement or tenderness  Neck: Normal laryngeal elevation with swallow  Trachea midline  No masses or lesions  No palpable adenopathy  Thyroid: Without tenderness or palpable nodules  Pulmonary/Chest: Normal effort and rate  No respiratory distress  No stertor or stridor  Musculoskeletal: Normal range of motion  Neurological: Cranial nerves 2-12 intact  Skin: Skin is warm and dry  Psychiatric: Normal mood and affect      Procedure: Cerumen debridement bilaterally    Indications: Cerumen impaction bilaterally     Procedure in detail: After informed verbal consent was obtained the ear was visualized using microscopy  Using cerumen loop, suction, and alligator forceps the cerumen was debrided and the findings below were seen  The patient tolerated the procedure well  FINDINGS:  Intact TM with intact normal landmarks  Procedure: Biospy of right oral cavity ulcer  Indications: Left oral cavity mucosal mass    Procedure in detail: After informed written consent was obtained from the patient the lesion was anesthetized using 1% lidocaine with 1:100,000 epinephrine  After adequate time for anesthesia 4 mm punch biopsy was performed from the leading edge  Hemostasis was obtained with silver nitrate  The patient tolerated the procedure well with no complications  Imaging Studies: I have personally reviewed pertinent reports  Lab Results: I have personally reviewed pertinent lab results  Greater than 40 minutes were spent in consultation  More than 1/2 of that time was spent in counselling and coordination of care

## 2018-12-21 NOTE — LETTER
December 21, 2018     Patient: Temple Bence  YOB: 1970   Date of Visit: 12/21/2018       To Whom it May Concern:    Chica Lira is under my professional care  He was seen in my office on 12/21/2018  He may go back to work 12/21/18  If you have any questions or concerns, please don't hesitate to call           Sincerely,          Glen Flores MD        CC: No Recipients

## 2018-12-21 NOTE — PROGRESS NOTES
Consultation - Otolaryngology - Head and Neck Surgery  Facial Plastic and Reconstructive Surgery  Edith Jerez  51 y o  male MRN: [id number]  Encounter: [id number]        Assessment/Plan:  1  Leukoplakia, gingiva  Tissue Exam    Tissue Exam   2  Tobacco abuse     3  Bilateral impacted cerumen         We recommend a biopsy of the oral lesion to evaluate for possible malignancy  We discussed the concerns for malignancy due to his history  Discussed the risk factors of oral cancers including smoking, chewing tobacco use, and alcohol use  Encouraged quitting tobacco use and alcohol use  Follow up in 2 weeks to discuss the results of pathology and re evaluate for any changes  Cerumen impaction:  We discussed the nature of cerumen impaction  We discussed appropriate treat with hydrogen peroxide 4-5 drops once per week  We discussed discontinuing the use of any Q-Tips or other objects into the ear  Tobacco abuse: Discussed risks of ongoing cigarette smoking  Discussed the benefits of quitting immediately and prior to any surgery  Discussed options including support, quit date, medications, and family support  Patient voiced understanding and knowledge  Greater than 3 minutes were needed for discussion of tobacco dependence  History of Present Illness   Physician Requesting Consult: Cristal Cast PA-C  Reason for Consult / Principal Problem: Oral lesion  HPI: Edith Jerez  is a 51y o  year old male who presents with an oral lesion first noticed 1 week ago  Patient states he had bleeding from the oral lesion on 12/13 and was seen at the ED  CT in the ED showed left submandibular gland enlargement without sialoadenitis and increased size and enhancement of the sublingual gland  He was prescribed Clindamycin for suspected infection  He notes he smokes 1 pack per day and uses 1 5 cans of chewing tobacco per day  Also admits to daily heavy alcohol use   States he has had a roughly 10 lb weight loss over the past 1-2 months  Denies any voice changes, neck masses, odynophagia  Notes some left sided otalgia and decreased hearing  He does not regularly see a dentist      Review of systems:  [id number] was performed and negative except as above or otherwise noted in the medical record  Historical Information   Past Medical History:   Diagnosis Date    Bacterial pneumonia     Diabetes mellitus ([facility] [address] [facility] )     Hyperglycemia     Hypertension     Pneumonia     Secondary male hypogonadism      Past Surgical History:   Procedure Laterality Date    TOOTH EXTRACTION      last assessed: 18     Social History   History   Alcohol Use    Yes     Comment: 1/2 of a fifth a day     History   Drug Use No     History   Smoking Status    Heavy Tobacco Smoker    Packs/day: 1     Types: Cigarettes   Smokeless Tobacco    Never Used     Family History:   Family History   Problem Relation Age of Onset   [place] Hypertension Mother     Hypertension Father     Heart disease Father     Heart disease Maternal Uncle         cardiac disorder    Diabetes Maternal Uncle     Hypertension Maternal Uncle        Current Outpatient Prescriptions on File Prior to Visit   Medication Sig    losartan-hydrochlorothiazide (HYZAAR) 100-25 MG per tablet Take 1 tablet by mouth daily    [] clindamycin (CLEOCIN) 150 mg capsule Take 1 capsule (150 mg total) by mouth every 6 (six) hours for 7 days     No current facility-administered medications on file prior to visit  Allergies   Allergen Reactions    Sulfa Antibiotics Rash and Hives       Vitals:    18 0716   BP: 140/80   Pulse: 102       Physical Exam   Constitutional: Oriented to person, place, and time  Well-developed and well-nourished, no apparent distress, non-toxic appearance  Cooperative, able to hear and answer questions without difficulty  Voice: Normal voice quality  Head: Normocephalic, atraumatic  No scars, masses or lesions    Face: Symmetric, no edema, no sinus tenderness  Eyes: Vision grossly intact, extra-ocular movement intact  Ears: External ears normal  Cerumen impaction Bilateral EAC  Tympanic membranes intact with intact normal landmarks  No post-auricular erythema or tenderness  Nose: Septum intact, nares clear  Mucosa moist, turbinates well appearing  No crusting, polyps or discharge evident  Oral cavity: Dentition absent  Mucosa moist, lips without lesions or masses  Tongue mobile, floor of mouth soft and flat  Hard palate intact  Irregularly shaped white ulcerative plaque on mandibular left lingual gingiva approximately 2 cm distal to midline  Adjacent to where teeth 22-24 would be  Ulcer is approximately 1 5 cm in length and extends onto the FOM  Oropharynx: Uvula is midline, soft palate intact without lesion or mass  Oropharyngeal inlet without obstruction  Tonsils unremarkable  Posterior pharyngeal wall clear  No masses or lesions  Salivary glands:  Parotid glands and submandibular glands symmetric, no enlargement or tenderness  Neck: Normal laryngeal elevation with swallow  Trachea midline  No masses or lesions  No palpable adenopathy  Thyroid: Without tenderness or palpable nodules  Pulmonary/Chest: Normal effort and rate  No respiratory distress  No stertor or stridor  Musculoskeletal: Normal range of motion  Neurological: Cranial nerves 2-12 intact  Skin: Skin is warm and dry  Psychiatric: Normal mood and affect  Procedure: Cerumen debridement bilaterally    Indications: Cerumen impaction bilaterally     Procedure in detail: After informed verbal consent was obtained the ear was visualized using microscopy  Using cerumen loop, suction, and alligator forceps the cerumen was debrided and the findings below were seen  The patient tolerated the procedure well  FINDINGS:  Intact TM with intact normal landmarks  Procedure: Biospy of right oral cavity ulcer      Indications: Left oral cavity mucosal mass    Procedure in detail: After informed written consent was obtained from the patient the lesion was anesthetized using 1% lidocaine with 1:100,000 epinephrine  After adequate time for anesthesia 4 mm punch biopsy was performed from the leading edge  Hemostasis was obtained with silver nitrate  The patient tolerated the procedure well with no complications  Imaging Studies: I have personally reviewed pertinent reports  Lab Results: I have personally reviewed pertinent lab results  Greater than 40 minutes were spent in consultation  More than 1/2 of that time was spent in counselling and coordination of care

## 2018-12-21 NOTE — LETTER
December 21, 2018     Malinda Rob PA-C  [address] [place] [id number]    Patient: Bhumika Gore  YOB: 1970   Date of Visit: 12/21/2018       Dear Dr Soares Parents: Thank you for referring Lynne Victoria to me for evaluation  Below are my notes for this consultation  If you have questions, please do not hesitate to call me  I look forward to following your patient along with you  Sincerely,        Leslie Vale MD        CC: No Recipients  Leslie Vale MD  12/21/2018  9:45 AM  Sign at close encounter  Consultation - Otolaryngology - Head and Neck Surgery  Facial Plastic and Reconstructive Surgery  Bhumika Gore  51 y o  male MRN: [id number]  Encounter: [id number]        Assessment/Plan:  1  Leukoplakia, gingiva     2  Tobacco abuse     3  Impacted cerumen of left ear         We recommend a biopsy of the oral lesion to check for malignancy  We discussed the possibility of malignancy  Discussed the risks of oral cancers including smoking, chewing tobacco use, and alcohol use  Encouraged quitting tobacco use and alcohol use  Follow up in 2 weeks to discuss the results of pathology and re evaluate for any changes  Cerumen impaction:  We discussed the nature of cerumen impaction  We discussed appropriate treat with hydrogen peroxide 4-5 drops once per week  We discussed discontinuing the use of any Q-Tips or other objects into the ear  History of Present Illness   Physician Requesting Consult: Malinda Rob PA-C  Reason for Consult / Principal Problem: Oral lesion  HPI: Bhumika Gore  is a 51y o  year old male who presents with an oral lesion first noticed 1 week ago  Patient states he had bleeding from the oral lesion on 12/13 and was seen at the ED  CT in the ED showed left submandibular gland enlargement without sialoadenitis and increased size and enhancement of the sublingual gland  He was prescribed Clindamycin for suspected infection   He notes he smokes 1 pack per day and uses 1 5 cans of chewing tobacco per day  Also admits to daily heavy alcohol use  States he has had a roughly 10 lb weight loss over the past 1-2 months  Denies any voice changes, neck masses, odynophagia  Notes some left sided otalgia and decreased hearing  He does not regularly see a dentist      Review of systems:  [address] was performed and negative except as above or otherwise noted in the medical record  Historical Information   Past Medical History:   Diagnosis Date    Bacterial pneumonia     Diabetes mellitus ([address] )     Hyperglycemia     Hypertension     Pneumonia     Secondary male hypogonadism      Past Surgical History:   Procedure Laterality Date    TOOTH EXTRACTION      last assessed: 18     Social History   History   Alcohol Use    Yes     Comment: 1/2 of a fifth a day     History   Drug Use No     History   Smoking Status    Heavy Tobacco Smoker    Packs/day: 1 00    Types: Cigarettes   Smokeless Tobacco    Never Used     Family History:   Family History   Problem Relation Age of Onset   [facility] Hypertension Mother     Hypertension Father     Heart disease Father     Heart disease Maternal Uncle         cardiac disorder    Diabetes Maternal Uncle     Hypertension Maternal Uncle        Current Outpatient Prescriptions on File Prior to Visit   Medication Sig    losartan-hydrochlorothiazide (HYZAAR) 100-25 MG per tablet Take 1 tablet by mouth daily    [] clindamycin (CLEOCIN) 150 mg capsule Take 1 capsule (150 mg total) by mouth every 6 (six) hours for 7 days     No current facility-administered medications on file prior to visit  Allergies   Allergen Reactions    Sulfa Antibiotics Rash and Hives       Vitals:    18 0716   BP: 140/80   Pulse: 102       Physical Exam   Constitutional: Oriented to person, place, and time  Well-developed and well-nourished, no apparent distress, non-toxic appearance   Cooperative, able to hear and answer questions without difficulty  Voice: Normal voice quality  Head: Normocephalic, atraumatic  No scars, masses or lesions  Face: Symmetric, no edema, no sinus tenderness  Eyes: Vision grossly intact, extra-ocular movement intact  Ears: External ears normal  Cerumen impaction left EAC  Tympanic membranes intact with intact normal landmarks  No post-auricular erythema or tenderness  Nose: Septum intact, nares clear  Mucosa moist, turbinates well appearing  No crusting, polyps or discharge evident  Oral cavity: Dentition absent  Mucosa moist, lips without lesions or masses  Tongue mobile, floor of mouth soft and flat  Hard palate intact  Irregularly shaped white plaque on bottom left interior gingiva approximately 2 cm distal to midline  Oropharynx: Uvula is midline, soft palate intact without lesion or mass  Oropharyngeal inlet without obstruction  Tonsils unremarkable  Posterior pharyngeal wall clear  No masses or lesions  Salivary glands:  Parotid glands and submandibular glands symmetric, no enlargement or tenderness  Neck: Normal laryngeal elevation with swallow  Trachea midline  No masses or lesions  No palpable adenopathy  Thyroid: Without tenderness or palpable nodules  Pulmonary/Chest: Normal effort and rate  No respiratory distress  No stertor or stridor  Musculoskeletal: Normal range of motion  Neurological: Cranial nerves 2-12 intact  Skin: Skin is warm and dry  Psychiatric: Normal mood and affect  Procedure: Cerumen debridement bilaterally    Indications: Cerumen impaction bilaterally     Procedure in detail: After informed verbal consent was obtained the ear was visualized using microscopy  Using cerumen loop, suction, and alligator forceps the cerumen was debrided and the findings below were seen  The patient tolerated the procedure well  FINDINGS:  Intact TM with intact normal landmarks  Procedure: Biospy of right oral cavity ulcer      Indications: Left oral cavity mucosal mass    Procedure in detail: After informed written consent was obtained from the patient the lesion was anesthetized using 1% lidocaine with 1:100,000 epinephrine  After adequate time for anesthesia 4 mm punch biopsy was performed from the leading edge  Hemostasis was obtained with silver nitrate  The patient tolerated the procedure well with no complications  Imaging Studies: I have personally reviewed pertinent reports  Lab Results: I have personally reviewed pertinent lab results  Greater than 40 minutes were spent in consultation  More than 1/2 of that time was spent in counselling and coordination of care

## 2018-12-21 NOTE — PROGRESS NOTES
Review of Systems   Constitutional: Negative  HENT: Positive for ear pain, mouth sores, rhinorrhea and sore throat  Eyes: Negative  Respiratory: Positive for cough  Cardiovascular: Negative  Gastrointestinal: Negative  Endocrine: Negative  Genitourinary: Negative  Musculoskeletal: Negative  Skin: Negative  Allergic/Immunologic: Negative  Neurological: Negative  Hematological: Negative  Psychiatric/Behavioral: Negative

## 2018-12-21 NOTE — LETTER
December 21, 2018     Patient: Cynthia Newsome  YOB: 1970   Date of Visit: 12/21/2018       To Whom it May Concern:    Papito Mcarthurdon is under my professional care  He was seen in my office on 12/21/2018  He {Return to school/sport/work:[id number]}  If you have any questions or concerns, please don't hesitate to call           Sincerely,          Britt Gamble MD        CC: No Recipients